# Patient Record
Sex: FEMALE | Race: WHITE | Employment: PART TIME | URBAN - METROPOLITAN AREA
[De-identification: names, ages, dates, MRNs, and addresses within clinical notes are randomized per-mention and may not be internally consistent; named-entity substitution may affect disease eponyms.]

---

## 2021-03-14 ENCOUNTER — APPOINTMENT (OUTPATIENT)
Dept: ULTRASOUND IMAGING | Age: 21
End: 2021-03-14
Attending: EMERGENCY MEDICINE

## 2021-03-14 ENCOUNTER — HOSPITAL ENCOUNTER (EMERGENCY)
Age: 21
Discharge: HOME | End: 2021-03-14
Attending: EMERGENCY MEDICINE

## 2021-03-14 VITALS
DIASTOLIC BLOOD PRESSURE: 80 MMHG | RESPIRATION RATE: 16 BRPM | HEART RATE: 90 BPM | TEMPERATURE: 99.1 F | SYSTOLIC BLOOD PRESSURE: 124 MMHG | BODY MASS INDEX: 27.31 KG/M2 | HEIGHT: 64 IN | WEIGHT: 160 LBS | OXYGEN SATURATION: 99 %

## 2021-03-14 DIAGNOSIS — N83.202 CYSTS OF BOTH OVARIES: ICD-10-CM

## 2021-03-14 DIAGNOSIS — N83.201 CYSTS OF BOTH OVARIES: ICD-10-CM

## 2021-03-14 DIAGNOSIS — N93.8 DUB (DYSFUNCTIONAL UTERINE BLEEDING): Primary | ICD-10-CM

## 2021-03-14 PROCEDURE — 99283 EMERGENCY DEPT VISIT LOW MDM: CPT

## 2021-03-14 PROCEDURE — 76830 TRANSVAGINAL US NON-OB: CPT

## 2021-03-14 RX ORDER — MEDROXYPROGESTERONE ACETATE 5 MG/1
5 TABLET ORAL DAILY
Qty: 10 TAB | Refills: 0 | Status: SHIPPED | OUTPATIENT
Start: 2021-03-14

## 2021-03-14 RX ORDER — IBUPROFEN 800 MG/1
800 TABLET ORAL
Qty: 20 TAB | Refills: 0 | Status: SHIPPED | OUTPATIENT
Start: 2021-03-14 | End: 2021-03-21

## 2021-03-14 NOTE — ED TRIAGE NOTES
Patient ambulatory to triage with mask in place. Patient reports vaginal bleeding x 13 days. Pt reports bleeding through 6-7 pads/day. Pt reports abd cramping and fatigue.

## 2021-03-14 NOTE — ED NOTES
I have reviewed discharge instructions with the patient. The patient verbalized understanding. Patient left ED via Discharge Method: ambulatory to Home with friend. Opportunity for questions and clarification provided. Patient given 2 scripts. To continue your aftercare when you leave the hospital, you may receive an automated call from our care team to check in on how you are doing. This is a free service and part of our promise to provide the best care and service to meet your aftercare needs.  If you have questions, or wish to unsubscribe from this service please call [phone]. Thank you for Choosing our [facility] Emergency Department.

## 2021-03-14 NOTE — LETTER
[address] [facility] EMERGENCY DEPT 
[address] [facility] 
[phone] Work/School Note Date: 3/14/2021 To Whom It May concern: 
 
Sharyn Cintron was seen and treated today in the emergency room by the following provider(s): 
Attending Provider: Hermes Ford MD. Sharyn Cintron may return to work on 3/16/21.  
 
Sincerely,

## 2021-03-14 NOTE — ED PROVIDER NOTES
28-year-old female presenting for concern regarding persistent vaginal bleeding. Tells me that she has been bleeding for 13 days which is highly unusual for her  Associated with some cramping and abdominal pain  Decreased appetite and weakness  Tells me she is going through 6 or 7 pads per day  Seen yesterday for the same thing was prescribed a medication that she has not started  Denies fevers chills or vomiting    The history is provided by the patient. Vaginal Bleeding  This is a recurrent problem. The current episode started more than 1 week ago. The problem occurs daily. The problem has not changed since onset. Associated symptoms include abdominal pain. Pertinent negatives include no chest pain, no headaches and no shortness of breath. Nothing aggravates the symptoms. Nothing relieves the symptoms. She has tried nothing for the symptoms. The treatment provided no relief. No past medical history on file. No past surgical history on file. No family history on file.     Social History     Socioeconomic History    Marital status: SINGLE     Spouse name: Not on file    Number of children: Not on file    Years of education: Not on file    Highest education level: Not on file   Occupational History    Not on file   Social Needs    Financial resource strain: Not on file    Food insecurity     Worry: Not on file     Inability: Not on file    Transportation needs     Medical: Not on file     Non-medical: Not on file   Tobacco Use    Smoking status: Not on file   Substance and Sexual Activity    Alcohol use: Not on file    Drug use: Not on file    Sexual activity: Not on file   Lifestyle    Physical activity     Days per week: Not on file     Minutes per session: Not on file    Stress: Not on file   Relationships    Social connections     Talks on phone: Not on file     Gets together: Not on file     Attends Church service: Not on file     Active member of club or organization: Not on file     Attends meetings of clubs or organizations: Not on file     Relationship status: Not on file    Intimate partner violence     Fear of current or ex partner: Not on file     Emotionally abused: Not on file     Physically abused: Not on file     Forced sexual activity: Not on file   Other Topics Concern    Not on file   Social History Narrative    Not on file         ALLERGIES: Patient has no known allergies. Review of Systems   Respiratory: Negative for shortness of breath. Cardiovascular: Negative for chest pain. Gastrointestinal: Positive for abdominal pain. Genitourinary: Positive for vaginal bleeding. Neurological: Negative for headaches. All other systems reviewed and are negative. Vitals:    03/14/21 1137   BP: 124/80   Pulse: 90   Resp: 16   Temp: 99.1 °F (37.3 °C)   SpO2: 99%   Weight: 72.6 kg (160 lb)   Height: 5' 4\" (1.626 m)            Physical Exam  Vitals signs and nursing note reviewed. Constitutional:       Appearance: She is well-developed. HENT:      Head: Normocephalic and atraumatic. Eyes:      Extraocular Movements: Extraocular movements intact. Conjunctiva/sclera: Conjunctivae normal.      Pupils: Pupils are equal, round, and reactive to light. Neck:      Musculoskeletal: Neck supple. Pulmonary:      Effort: Pulmonary effort is normal.   Musculoskeletal: Normal range of motion. Skin:     General: Skin is warm and dry. Neurological:      Mental Status: She is alert and oriented to person, place, and time. MDM  Number of Diagnoses or Management Options  Cysts of both ovaries  DUB (dysfunctional uterine bleeding)  Diagnosis management comments: Pleasant 39-year-old female presenting for dysfunctional uterine bleeding  Patient was seen yesterday and had blood work done showed a normal hemoglobin otherwise unremarkable findings. We will add an ultrasound to the patient's work-up given her return visit and increased concern.          Amount and/or Complexity of Data Reviewed  Tests in the radiology section of CPT®: ordered and reviewed (Us Transvaginal    Result Date: 3/14/2021  Exam: Transabdominal and transvaginal pelvic ultrasound. INDICATION: Menorrhagia. Comparison: None. FINDINGS: Uterus/Cervix: The uterus is normal in echogenicity and size measuring 6.7 x 3.3 x 4.9 cm. The endometrial complex is normal measuring 3 mm. No uterine fibroids. Nabothian cyst evident. Right Ovary/Adnexa: The right ovary is normal in echogenicity and size measuring 3.9 x 2.8 x 2.8 cm. Complex cyst likely corpus luteal cyst measuring 2.3 x 2.0 x 1.6 cm. Left Ovary/Adnexa: The left ovary is normal in echogenicity and size measuring 3.7 x 1.9 x 2.5 cm. Complex left ovarian cyst, likely hemorrhagic measuring 2.1 x 0.8 x 1.6 cm. Free Fluid: Moderate volume of free fluid in the pelvis with some debris. 1. Endometrial complex is normal in thickness measuring 3 mm. No uterine fibroid. 2. Moderate volume of free fluid in the pelvis with debris. 3. Complex right ovarian cyst measuring 2.3 cm, likely corpus luteal cyst. Complex left ovarian cyst measuring 2.1 cm, likely hemorrhagic cyst.     )  Decide to obtain previous medical records or to obtain history from someone other than the patient: yes  Review and summarize past medical records: yes (Unremarkable blood work at [facility] yesterday for the same complaint)  Independent visualization of images, tracings, or specimens: yes    Risk of Complications, Morbidity, and/or Mortality  Presenting problems: high  Diagnostic procedures: moderate  Management options: moderate  General comments: I personally reviewed the patient's vital signs, laboratory tests, and/or radiological findings. I discussed these findings with the patient and their significance. I answered all questions and gave the patient clear return precautions.   The patient was discharged from the emergency department in stable condition        Patient Progress  Patient progress: improved         Procedures

## 2021-03-14 NOTE — DISCHARGE INSTRUCTIONS
Your blood work yesterday was unremarkable and then your ultrasound today shows that the uterus is normal.  You do have bilateral functional ovarian cyst which is normal    I have prescribed Provera instead of the birth control you are put on yesterday. This is the progesterone only medication and will help stabilize the endometrium.   800 mg of Motrin every 6 hours is also appropriate for cramping

## 2023-07-29 ENCOUNTER — HOSPITAL ENCOUNTER (EMERGENCY)
Age: 23
Discharge: HOME | End: 2023-07-29
Attending: EMERGENCY MEDICINE

## 2023-07-29 VITALS
RESPIRATION RATE: 16 BRPM | WEIGHT: 164 LBS | BODY MASS INDEX: 27.32 KG/M2 | OXYGEN SATURATION: 100 % | TEMPERATURE: 98.4 F | HEIGHT: 65 IN | DIASTOLIC BLOOD PRESSURE: 68 MMHG | SYSTOLIC BLOOD PRESSURE: 109 MMHG | HEART RATE: 89 BPM
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
## 2023-07-29 DIAGNOSIS — S06.0X0A CONCUSSION WITHOUT LOSS OF CONSCIOUSNESS, INITIAL ENCOUNTER: ICD-10-CM

## 2023-07-29 PROCEDURE — 6370000000 HC RX 637 (ALT 250 FOR IP): Performed by: STUDENT IN AN ORGANIZED HEALTH CARE EDUCATION/TRAINING PROGRAM

## 2023-07-29 PROCEDURE — 99283 EMERGENCY DEPT VISIT LOW MDM: CPT

## 2023-07-29 RX ORDER — ACETAMINOPHEN 500 MG
1000 TABLET ORAL
Status: COMPLETED | OUTPATIENT
Start: 2023-07-29 | End: 2023-07-29

## 2023-07-29 RX ADMIN — ACETAMINOPHEN 1000 MG: 500 TABLET, FILM COATED ORAL at 13:57

## 2023-07-29 ASSESSMENT — ENCOUNTER SYMPTOMS
FACIAL SWELLING: 0
TROUBLE SWALLOWING: 0
PHOTOPHOBIA: 1
ABDOMINAL PAIN: 0
COUGH: 0
SHORTNESS OF BREATH: 0
VOMITING: 0

## 2023-07-29 ASSESSMENT — PAIN SCALES - GENERAL: PAINLEVEL_OUTOF10: 5

## 2023-07-29 ASSESSMENT — PAIN DESCRIPTION - LOCATION: LOCATION: HEAD

## 2023-07-29 ASSESSMENT — PAIN - FUNCTIONAL ASSESSMENT: PAIN_FUNCTIONAL_ASSESSMENT: 0-10

## 2023-07-29 ASSESSMENT — VISUAL ACUITY: OU: 1
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
## 2024-09-09 ENCOUNTER — HOSPITAL ENCOUNTER (EMERGENCY)
Age: 24
Discharge: HOME | End: 2024-09-09
Attending: STUDENT IN AN ORGANIZED HEALTH CARE EDUCATION/TRAINING PROGRAM

## 2024-09-09 VITALS
TEMPERATURE: 98.4 F | BODY MASS INDEX: 30.71 KG/M2 | RESPIRATION RATE: 16 BRPM | HEIGHT: 65 IN | OXYGEN SATURATION: 99 % | DIASTOLIC BLOOD PRESSURE: 66 MMHG | WEIGHT: 184.3 LBS | SYSTOLIC BLOOD PRESSURE: 110 MMHG | HEART RATE: 80 BPM

## 2024-09-09 DIAGNOSIS — N39.0 URINARY TRACT INFECTION IN FEMALE: ICD-10-CM

## 2024-09-09 DIAGNOSIS — D50.9 MICROCYTIC ANEMIA: ICD-10-CM

## 2024-09-09 DIAGNOSIS — K50.90 CROHN'S DISEASE WITHOUT COMPLICATION, UNSPECIFIED GASTROINTESTINAL TRACT LOCATION (HCC): Primary | ICD-10-CM

## 2024-09-09 LAB
ALBUMIN SERPL-MCNC: 4.3 G/DL (ref 3.5–5)
ALBUMIN/GLOB SERPL: 1.2 (ref 1–1.9)
ALP SERPL-CCNC: 64 U/L (ref 35–104)
ALT SERPL-CCNC: 16 U/L (ref 12–65)
ANION GAP SERPL CALC-SCNC: 12 MMOL/L (ref 9–18)
APPEARANCE UR: CLEAR
AST SERPL-CCNC: 22 U/L (ref 15–37)
BACTERIA URNS QL MICRO: ABNORMAL /HPF
BASOPHILS # BLD: 0.1 K/UL (ref 0–0.2)
BASOPHILS NFR BLD: 1 % (ref 0–2)
BILIRUB SERPL-MCNC: 0.3 MG/DL (ref 0–1.2)
BILIRUB UR QL: NEGATIVE
BUN SERPL-MCNC: 8 MG/DL (ref 6–23)
CALCIUM SERPL-MCNC: 9.5 MG/DL (ref 8.8–10.2)
CASTS URNS QL MICRO: 0 /LPF
CHLORIDE SERPL-SCNC: 102 MMOL/L (ref 98–107)
CO2 SERPL-SCNC: 22 MMOL/L (ref 20–28)
COLOR UR: ABNORMAL
CREAT SERPL-MCNC: 0.56 MG/DL (ref 0.6–1.1)
CRYSTALS URNS QL MICRO: 0 /LPF
DIFFERENTIAL METHOD BLD: ABNORMAL
EOSINOPHIL # BLD: 0.2 K/UL (ref 0–0.8)
EOSINOPHIL NFR BLD: 2 % (ref 0.5–7.8)
EPI CELLS #/AREA URNS HPF: ABNORMAL /HPF
ERYTHROCYTE [DISTWIDTH] IN BLOOD BY AUTOMATED COUNT: 16.5 % (ref 11.9–14.6)
GLOBULIN SER CALC-MCNC: 3.6 G/DL (ref 2.3–3.5)
GLUCOSE SERPL-MCNC: 87 MG/DL (ref 70–99)
GLUCOSE UR STRIP.AUTO-MCNC: NEGATIVE MG/DL
HCG UR QL: NEGATIVE
HCT VFR BLD AUTO: 24.5 % (ref 35.8–46.3)
HGB BLD-MCNC: 6.7 G/DL (ref 11.7–15.4)
HGB UR QL STRIP: ABNORMAL
HISTORY CHECK: NORMAL
IMM GRANULOCYTES # BLD AUTO: 0 K/UL (ref 0–0.5)
IMM GRANULOCYTES NFR BLD AUTO: 0 % (ref 0–5)
KETONES UR QL STRIP.AUTO: NEGATIVE MG/DL
LEUKOCYTE ESTERASE UR QL STRIP.AUTO: ABNORMAL
LIPASE SERPL-CCNC: 25 U/L (ref 13–60)
LYMPHOCYTES # BLD: 1.9 K/UL (ref 0.5–4.6)
LYMPHOCYTES NFR BLD: 21 % (ref 13–44)
MAGNESIUM SERPL-MCNC: 1.8 MG/DL (ref 1.8–2.4)
MCH RBC QN AUTO: 18.5 PG (ref 26.1–32.9)
MCHC RBC AUTO-ENTMCNC: 27.3 G/DL (ref 31.4–35)
MCV RBC AUTO: 67.7 FL (ref 82–102)
MONOCYTES # BLD: 0.7 K/UL (ref 0.1–1.3)
MONOCYTES NFR BLD: 7 % (ref 4–12)
MUCOUS THREADS URNS QL MICRO: ABNORMAL /LPF
NEUTS SEG # BLD: 6 K/UL (ref 1.7–8.2)
NEUTS SEG NFR BLD: 68 % (ref 43–78)
NITRITE UR QL STRIP.AUTO: NEGATIVE
NRBC # BLD: 0 K/UL (ref 0–0.2)
OTHER OBSERVATIONS: ABNORMAL
PH UR STRIP: 6.5 (ref 5–9)
PLATELET # BLD AUTO: 570 K/UL (ref 150–450)
PMV BLD AUTO: 9 FL (ref 9.4–12.3)
POTASSIUM SERPL-SCNC: 4 MMOL/L (ref 3.5–5.1)
PROT SERPL-MCNC: 7.9 G/DL (ref 6.3–8.2)
PROT UR STRIP-MCNC: NEGATIVE MG/DL
RBC # BLD AUTO: 3.62 M/UL (ref 4.05–5.2)
RBC #/AREA URNS HPF: ABNORMAL /HPF
SODIUM SERPL-SCNC: 137 MMOL/L (ref 136–145)
SP GR UR REFRACTOMETRY: 1.02 (ref 1–1.02)
URINE CULTURE IF INDICATED: ABNORMAL
UROBILINOGEN UR QL STRIP.AUTO: 0.2 EU/DL (ref 0.2–1)
WBC # BLD AUTO: 8.8 K/UL (ref 4.3–11.1)
WBC URNS QL MICRO: ABNORMAL /HPF

## 2024-09-09 PROCEDURE — 96361 HYDRATE IV INFUSION ADD-ON: CPT

## 2024-09-09 PROCEDURE — 81003 URINALYSIS AUTO W/O SCOPE: CPT

## 2024-09-09 PROCEDURE — 81025 URINE PREGNANCY TEST: CPT

## 2024-09-09 PROCEDURE — 6360000002 HC RX W HCPCS: Performed by: STUDENT IN AN ORGANIZED HEALTH CARE EDUCATION/TRAINING PROGRAM

## 2024-09-09 PROCEDURE — 80053 COMPREHEN METABOLIC PANEL: CPT

## 2024-09-09 PROCEDURE — 83735 ASSAY OF MAGNESIUM: CPT

## 2024-09-09 PROCEDURE — 96375 TX/PRO/DX INJ NEW DRUG ADDON: CPT

## 2024-09-09 PROCEDURE — 83690 ASSAY OF LIPASE: CPT

## 2024-09-09 PROCEDURE — 81001 URINALYSIS AUTO W/SCOPE: CPT

## 2024-09-09 PROCEDURE — 86850 RBC ANTIBODY SCREEN: CPT

## 2024-09-09 PROCEDURE — 85025 COMPLETE CBC W/AUTO DIFF WBC: CPT

## 2024-09-09 PROCEDURE — 86900 BLOOD TYPING SEROLOGIC ABO: CPT

## 2024-09-09 PROCEDURE — 86923 COMPATIBILITY TEST ELECTRIC: CPT

## 2024-09-09 PROCEDURE — 99285 EMERGENCY DEPT VISIT HI MDM: CPT

## 2024-09-09 PROCEDURE — 86901 BLOOD TYPING SEROLOGIC RH(D): CPT

## 2024-09-09 PROCEDURE — 36430 TRANSFUSION BLD/BLD COMPNT: CPT

## 2024-09-09 PROCEDURE — P9016 RBC LEUKOCYTES REDUCED: HCPCS

## 2024-09-09 PROCEDURE — 2580000003 HC RX 258: Performed by: STUDENT IN AN ORGANIZED HEALTH CARE EDUCATION/TRAINING PROGRAM

## 2024-09-09 PROCEDURE — 96374 THER/PROPH/DIAG INJ IV PUSH: CPT

## 2024-09-09 PROCEDURE — 6370000000 HC RX 637 (ALT 250 FOR IP): Performed by: STUDENT IN AN ORGANIZED HEALTH CARE EDUCATION/TRAINING PROGRAM

## 2024-09-09 RX ORDER — PANTOPRAZOLE SODIUM 40 MG/1
40 TABLET, DELAYED RELEASE ORAL
Qty: 90 TABLET | Refills: 1 | Status: SHIPPED | OUTPATIENT
Start: 2024-09-09

## 2024-09-09 RX ORDER — SODIUM CHLORIDE 9 MG/ML
INJECTION, SOLUTION INTRAVENOUS PRN
Status: DISCONTINUED | OUTPATIENT
Start: 2024-09-09 | End: 2024-09-09 | Stop reason: HOSPADM

## 2024-09-09 RX ORDER — DEXAMETHASONE SODIUM PHOSPHATE 10 MG/ML
10 INJECTION INTRAMUSCULAR; INTRAVENOUS ONCE
Status: COMPLETED | OUTPATIENT
Start: 2024-09-09 | End: 2024-09-09

## 2024-09-09 RX ORDER — FERROUS SULFATE 325(65) MG
325 TABLET ORAL
Qty: 90 TABLET | Refills: 1 | Status: SHIPPED | OUTPATIENT
Start: 2024-09-09

## 2024-09-09 RX ORDER — CEPHALEXIN 500 MG/1
500 CAPSULE ORAL 3 TIMES DAILY
Qty: 15 CAPSULE | Refills: 0 | Status: SHIPPED | OUTPATIENT
Start: 2024-09-09 | End: 2024-09-14

## 2024-09-09 RX ORDER — DICYCLOMINE HYDROCHLORIDE 10 MG/1
10 CAPSULE ORAL
Status: DISCONTINUED | OUTPATIENT
Start: 2024-09-09 | End: 2024-09-09 | Stop reason: HOSPADM

## 2024-09-09 RX ORDER — ONDANSETRON 2 MG/ML
4 INJECTION INTRAMUSCULAR; INTRAVENOUS
Status: COMPLETED | OUTPATIENT
Start: 2024-09-09 | End: 2024-09-09

## 2024-09-09 RX ORDER — ACETAMINOPHEN 325 MG/1
650 TABLET ORAL
Status: COMPLETED | OUTPATIENT
Start: 2024-09-09 | End: 2024-09-09

## 2024-09-09 RX ORDER — SODIUM CHLORIDE, SODIUM LACTATE, POTASSIUM CHLORIDE, AND CALCIUM CHLORIDE .6; .31; .03; .02 G/100ML; G/100ML; G/100ML; G/100ML
1000 INJECTION, SOLUTION INTRAVENOUS ONCE
Status: COMPLETED | OUTPATIENT
Start: 2024-09-09 | End: 2024-09-09

## 2024-09-09 RX ORDER — PREDNISONE 50 MG/1
50 TABLET ORAL DAILY
Qty: 5 TABLET | Refills: 0 | Status: SHIPPED | OUTPATIENT
Start: 2024-09-09 | End: 2024-09-14

## 2024-09-09 RX ORDER — FERROUS SULFATE 325(65) MG
325 TABLET ORAL 2 TIMES DAILY WITH MEALS
Status: COMPLETED | OUTPATIENT
Start: 2024-09-09 | End: 2024-09-09

## 2024-09-09 RX ADMIN — SODIUM CHLORIDE, POTASSIUM CHLORIDE, SODIUM LACTATE AND CALCIUM CHLORIDE 1000 ML: 600; 310; 30; 20 INJECTION, SOLUTION INTRAVENOUS at 12:18

## 2024-09-09 RX ADMIN — ACETAMINOPHEN 650 MG: 325 TABLET ORAL at 12:06

## 2024-09-09 RX ADMIN — FERROUS SULFATE TAB 325 MG (65 MG ELEMENTAL FE) 325 MG: 325 (65 FE) TAB at 15:17

## 2024-09-09 RX ADMIN — WATER 1000 MG: 1 INJECTION INTRAMUSCULAR; INTRAVENOUS; SUBCUTANEOUS at 15:11

## 2024-09-09 RX ADMIN — ONDANSETRON 4 MG: 2 INJECTION INTRAMUSCULAR; INTRAVENOUS at 12:14

## 2024-09-09 RX ADMIN — DEXAMETHASONE SODIUM PHOSPHATE 10 MG: 10 INJECTION INTRAMUSCULAR; INTRAVENOUS at 12:16

## 2024-09-09 RX ADMIN — PANTOPRAZOLE SODIUM 40 MG: 40 INJECTION, POWDER, FOR SOLUTION INTRAVENOUS at 12:22

## 2024-09-09 ASSESSMENT — PAIN - FUNCTIONAL ASSESSMENT
PAIN_FUNCTIONAL_ASSESSMENT: 0-10
PAIN_FUNCTIONAL_ASSESSMENT: NONE - DENIES PAIN

## 2024-09-09 ASSESSMENT — LIFESTYLE VARIABLES
HOW MANY STANDARD DRINKS CONTAINING ALCOHOL DO YOU HAVE ON A TYPICAL DAY: 3 OR 4
HOW OFTEN DO YOU HAVE A DRINK CONTAINING ALCOHOL: MONTHLY OR LESS

## 2024-09-09 ASSESSMENT — PAIN DESCRIPTION - LOCATION
LOCATION: ABDOMEN
LOCATION: ABDOMEN

## 2024-09-09 ASSESSMENT — PAIN SCALES - GENERAL: PAINLEVEL_OUTOF10: 4

## 2024-09-10 LAB
ABO + RH BLD: NORMAL
BILIRUB UR QL: NEGATIVE
BLD PROD TYP BPU: NORMAL
BLOOD BANK BLOOD PRODUCT EXPIRATION DATE: NORMAL
BLOOD BANK CMNT PATIENT-IMP: NORMAL
BLOOD BANK DISPENSE STATUS: NORMAL
BLOOD BANK ISBT PRODUCT BLOOD TYPE: 6200
BLOOD BANK PRODUCT CODE: NORMAL
BLOOD BANK UNIT TYPE AND RH: NORMAL
BLOOD GROUP ANTIBODIES SERPL: NORMAL
BPU ID: NORMAL
CROSSMATCH RESULT: NORMAL
GLUCOSE UR QL STRIP.AUTO: NEGATIVE MG/DL
KETONES UR-MCNC: NEGATIVE MG/DL
LEUKOCYTE ESTERASE UR QL STRIP: ABNORMAL
NITRITE UR QL: NEGATIVE
PH UR: 7 (ref 5–9)
PROT UR QL: NEGATIVE MG/DL
RBC # UR STRIP: ABNORMAL
SP GR UR: 1.02 (ref 1–1.02)
SPECIMEN EXP DATE BLD: NORMAL
UNIT DIVISION: 0
UNIT ISSUE DATE/TIME: NORMAL
UROBILINOGEN UR QL: 0.2 EU/DL (ref 0.2–1)

## 2024-09-11 ENCOUNTER — TELEPHONE (OUTPATIENT)
Dept: GASTROENTEROLOGY | Age: 24
End: 2024-09-11

## 2024-09-19 ENCOUNTER — TELEPHONE (OUTPATIENT)
Dept: GASTROENTEROLOGY | Age: 24
End: 2024-09-19

## 2024-09-23 ENCOUNTER — TELEPHONE (OUTPATIENT)
Age: 24
End: 2024-09-23

## 2024-10-08 ENCOUNTER — PREP FOR PROCEDURE (OUTPATIENT)
Dept: GASTROENTEROLOGY | Age: 24
End: 2024-10-08

## 2024-10-08 ENCOUNTER — OFFICE VISIT (OUTPATIENT)
Dept: GASTROENTEROLOGY | Age: 24
End: 2024-10-08

## 2024-10-08 VITALS
OXYGEN SATURATION: 99 % | SYSTOLIC BLOOD PRESSURE: 119 MMHG | HEIGHT: 65 IN | RESPIRATION RATE: 16 BRPM | DIASTOLIC BLOOD PRESSURE: 72 MMHG | HEART RATE: 86 BPM | TEMPERATURE: 98 F | BODY MASS INDEX: 28.99 KG/M2 | WEIGHT: 174 LBS

## 2024-10-08 DIAGNOSIS — K50.919 CROHN'S DISEASE WITH COMPLICATION, UNSPECIFIED GASTROINTESTINAL TRACT LOCATION (HCC): ICD-10-CM

## 2024-10-08 DIAGNOSIS — K50.919 CROHN'S DISEASE WITH COMPLICATION, UNSPECIFIED GASTROINTESTINAL TRACT LOCATION (HCC): Primary | ICD-10-CM

## 2024-10-08 PROBLEM — K50.90 CROHN'S DISEASE (HCC): Status: ACTIVE | Noted: 2024-10-08

## 2024-10-08 LAB
ALBUMIN SERPL-MCNC: 3.9 G/DL (ref 3.5–5)
ALBUMIN/GLOB SERPL: 1.2 (ref 1–1.9)
ALP SERPL-CCNC: 62 U/L (ref 35–104)
ALT SERPL-CCNC: 16 U/L (ref 8–45)
ANION GAP SERPL CALC-SCNC: 11 MMOL/L (ref 9–18)
AST SERPL-CCNC: 20 U/L (ref 15–37)
BILIRUB SERPL-MCNC: 0.2 MG/DL (ref 0–1.2)
BUN SERPL-MCNC: 8 MG/DL (ref 6–23)
CALCIUM SERPL-MCNC: 9.2 MG/DL (ref 8.8–10.2)
CHLORIDE SERPL-SCNC: 105 MMOL/L (ref 98–107)
CO2 SERPL-SCNC: 25 MMOL/L (ref 20–28)
CREAT SERPL-MCNC: 0.66 MG/DL (ref 0.6–1.1)
ERYTHROCYTE [SEDIMENTATION RATE] IN BLOOD: 9 MM/HR (ref 0–20)
GLOBULIN SER CALC-MCNC: 3.4 G/DL (ref 2.3–3.5)
GLUCOSE SERPL-MCNC: 95 MG/DL (ref 70–99)
HBV SURFACE AG SER QL: NONREACTIVE
POTASSIUM SERPL-SCNC: 4.3 MMOL/L (ref 3.5–5.1)
PROT SERPL-MCNC: 7.3 G/DL (ref 6.3–8.2)
SODIUM SERPL-SCNC: 140 MMOL/L (ref 136–145)

## 2024-10-08 PROCEDURE — 99204 OFFICE O/P NEW MOD 45 MIN: CPT | Performed by: STUDENT IN AN ORGANIZED HEALTH CARE EDUCATION/TRAINING PROGRAM

## 2024-10-08 RX ORDER — SODIUM CHLORIDE 9 MG/ML
25 INJECTION, SOLUTION INTRAVENOUS PRN
Status: CANCELLED | OUTPATIENT
Start: 2024-10-08

## 2024-10-08 RX ORDER — SODIUM CHLORIDE 0.9 % (FLUSH) 0.9 %
5-40 SYRINGE (ML) INJECTION EVERY 12 HOURS SCHEDULED
Status: CANCELLED | OUTPATIENT
Start: 2024-10-08

## 2024-10-08 RX ORDER — SODIUM CHLORIDE 0.9 % (FLUSH) 0.9 %
5-40 SYRINGE (ML) INJECTION PRN
Status: CANCELLED | OUTPATIENT
Start: 2024-10-08

## 2024-10-08 NOTE — PROGRESS NOTES
Silvia Cervantes is 23 y.o. y/o female here for initial evaluation.     No procedures noted in the chart review.   CT ABDOMEN PELVIS WO CONTRAST  7/2022  FINDINGS:     LOWER CHEST: Unremarkable.     LIVER AND BILIARY: Unremarkable appearance of the unenhanced liver.  No biliary dilation. Gallbladder is unremarkable.     PANCREAS: Unremarkable.     SPLEEN: Unremarkable.     ADRENALS: Unremarkable.     KIDNEYS, URETERS, AND BLADDER: No radiopaque nephrolithiasis or hydronephrosis. Urinary bladder is decompressed.     GI TRACT AND PERITONEUM: No bowel obstruction. Normal appendix. No significant small bowel wall thickening appreciated. The terminal ileum is decompressed, with submucosal fatty deposition, likely indicative of prior inflammation. Wall thickening and submucosal edema of the colon and rectum with mild adjacent inflammatory stranding and small volume pelvic free fluid. No organized fluid collection. No free intraperitoneal gas.     VASCULATURE: No abdominal aortic aneurysm.     LYMPH NODES: No lymphadenopathy.     REPRODUCTIVE ORGANS: Unremarkable.     MUSCULOSKELETAL: Well-circumscribed cystic structure along the left aspect of the labia measuring 3.0 x 2.2 cm, likely a Bartholin gland cyst. No acute bony abnormality.     She reports diagnosis of Crohn's disease at age 6, between age [age] she was managed with Asacol.  Then she reports that she was in remission between age [age] however this was based on only symptoms per her report.  Then in the last 2 years she has been having slowly worsening symptoms along with flares intermittently.  Currently she is having significant abdominal cramping, ongoing bloody diarrhea, noted recent ER visit with low hemoglobin that required transfusion.    Lab Results   Component Value Date    HGB 6.7 (LL) 09/09/2024    WBC 8.8 09/09/2024     (H) 09/09/2024    MCV 67.7 (L) 09/09/2024    CREATININE 0.56 (L) 09/09/2024    ALT 16 09/09/2024    AST 22 09/09/2024       No

## 2024-10-09 LAB
BASOPHILS # BLD: 0.1 K/UL (ref 0–0.2)
BASOPHILS NFR BLD: 1 % (ref 0–2)
DIFFERENTIAL METHOD BLD: ABNORMAL
EOSINOPHIL # BLD: 0.2 K/UL (ref 0–0.8)
EOSINOPHIL NFR BLD: 3 % (ref 0.5–7.8)
HCT VFR BLD AUTO: 35.8 % (ref 35.8–46.3)
HGB BLD-MCNC: 10.5 G/DL (ref 11.7–15.4)
IMM GRANULOCYTES # BLD AUTO: 0 K/UL (ref 0–0.5)
IMM GRANULOCYTES NFR BLD AUTO: 0 % (ref 0–5)
LYMPHOCYTES # BLD: 2.4 K/UL (ref 0.5–4.6)
LYMPHOCYTES NFR BLD: 29 % (ref 13–44)
MCH RBC QN AUTO: 23.1 PG (ref 26.1–32.9)
MCHC RBC AUTO-ENTMCNC: 29.3 G/DL (ref 31.4–35)
MCV RBC AUTO: 78.7 FL (ref 82–102)
MONOCYTES # BLD: 0.7 K/UL (ref 0.1–1.3)
MONOCYTES NFR BLD: 9 % (ref 4–12)
NEUTS SEG # BLD: 4.8 K/UL (ref 1.7–8.2)
NEUTS SEG NFR BLD: 58 % (ref 43–78)
NRBC # BLD: 0 K/UL (ref 0–0.2)
PLATELET # BLD AUTO: 488 K/UL (ref 150–450)
PLATELET COMMENT: SLIGHT
PMV BLD AUTO: 9 FL (ref 9.4–12.3)
RBC # BLD AUTO: 4.55 M/UL (ref 4.05–5.2)
RBC MORPH BLD: ABNORMAL
WBC # BLD AUTO: 8.2 K/UL (ref 4.3–11.1)
WBC MORPH BLD: ABNORMAL

## 2024-10-09 NOTE — PERIOP NOTE
Patient verified name, , and procedure.    Type: 1a; abbreviated assessment per anesthesia guidelines    Labs per anesthesia: None    Instructed pt that they will be notified the day before their procedure by the GI Lab for time of arrival if their procedure is [place] and Pre-op for [place] cases. Arrival times should be called by 5 pm. If no phone is received the patient should contact their respective hospital. The GI lab telephone number is [phone] and ES Pre-op is [phone].     Follow diet and prep instructions per office including NPO status with exception of drink 32 ounces of non-caffeinated clear liquids 2 hours prior to your arrival to avoid dehydration.       Bath or shower the night before and the am of surgery with non-moisturizing soap. No lotions, oils, powders, cologne on skin. No make up, eye make up or jewelry. Wear loose fitting comfortable, clean clothing.     Must have adult present in building the entire time .     Medications for the day of procedure Pantoprozole, patient to hold Vitamins, supplements, herbals and NSAIDS  per anesthesia guidelines.     The following discharge instructions reviewed with patient: medication given during procedure may cause drowsiness for several hours, therefore, do not drive or operate machinery for remainder of the day. You may not drink alcohol on the day of your procedure, please resume regular diet and activity unless otherwise directed. You may experience abdominal distention for several hours that is relieved by the passage of gas. Contact your physician if you have any of the following: fever or chills, severe abdominal pain or excessive amount of bleeding or a large amount when having a bowel movement. Occasional specks of blood with bowel movement would not be unusual.    Pre surgery instructions sent to [facility]

## 2024-10-10 LAB — CRP SERPL-MCNC: <1 MG/L (ref 0–10)

## 2024-10-11 LAB
M TB IFN-G BLD-IMP: NEGATIVE
M TB IFN-G CD4+ T-CELLS BLD-ACNC: 0.04 IU/ML
M TBIFN-G CD4+ CD8+T-CELLS BLD-ACNC: 0.03 IU/ML
QUANTIFERON CRITERIA: NORMAL
QUANTIFERON MITOGEN VALUE: >10 IU/ML
QUANTIFERON NIL VALUE: 0.01 IU/ML
QUANTIFERON, INCUBATION: NORMAL

## 2024-10-11 NOTE — PROGRESS NOTES
Attempted to call patient regarding procedure on 10/14/24. No answer. Left voicemail with arrival time of 1130, address,  requirements and prep instructions.

## 2024-10-14 ENCOUNTER — TELEPHONE (OUTPATIENT)
Dept: GASTROENTEROLOGY | Age: 24
End: 2024-10-14

## 2024-10-14 ENCOUNTER — ANESTHESIA EVENT (OUTPATIENT)
Dept: ENDOSCOPY | Age: 24
End: 2024-10-14

## 2024-10-14 ENCOUNTER — HOSPITAL ENCOUNTER (OUTPATIENT)
Age: 24
Discharge: HOME | End: 2024-10-14
Attending: STUDENT IN AN ORGANIZED HEALTH CARE EDUCATION/TRAINING PROGRAM | Admitting: STUDENT IN AN ORGANIZED HEALTH CARE EDUCATION/TRAINING PROGRAM

## 2024-10-14 ENCOUNTER — ANESTHESIA (OUTPATIENT)
Dept: ENDOSCOPY | Age: 24
End: 2024-10-14

## 2024-10-14 VITALS
RESPIRATION RATE: 13 BRPM | HEIGHT: 65 IN | WEIGHT: 174 LBS | OXYGEN SATURATION: 96 % | BODY MASS INDEX: 28.99 KG/M2 | DIASTOLIC BLOOD PRESSURE: 64 MMHG | HEART RATE: 69 BPM | TEMPERATURE: 98.1 F | SYSTOLIC BLOOD PRESSURE: 105 MMHG

## 2024-10-14 PROCEDURE — 3700000001 HC ADD 15 MINUTES (ANESTHESIA): Performed by: STUDENT IN AN ORGANIZED HEALTH CARE EDUCATION/TRAINING PROGRAM

## 2024-10-14 PROCEDURE — 2500000003 HC RX 250 WO HCPCS: Performed by: REGISTERED NURSE

## 2024-10-14 PROCEDURE — 3609010300 HC COLONOSCOPY W/BIOPSY SINGLE/MULTIPLE: Performed by: STUDENT IN AN ORGANIZED HEALTH CARE EDUCATION/TRAINING PROGRAM

## 2024-10-14 PROCEDURE — 3609012400 HC EGD TRANSORAL BIOPSY SINGLE/MULTIPLE: Performed by: STUDENT IN AN ORGANIZED HEALTH CARE EDUCATION/TRAINING PROGRAM

## 2024-10-14 PROCEDURE — 3700000000 HC ANESTHESIA ATTENDED CARE: Performed by: STUDENT IN AN ORGANIZED HEALTH CARE EDUCATION/TRAINING PROGRAM

## 2024-10-14 PROCEDURE — 7100000010 HC PHASE II RECOVERY - FIRST 15 MIN: Performed by: STUDENT IN AN ORGANIZED HEALTH CARE EDUCATION/TRAINING PROGRAM

## 2024-10-14 PROCEDURE — 7100000011 HC PHASE II RECOVERY - ADDTL 15 MIN: Performed by: STUDENT IN AN ORGANIZED HEALTH CARE EDUCATION/TRAINING PROGRAM

## 2024-10-14 PROCEDURE — 88312 SPECIAL STAINS GROUP 1: CPT

## 2024-10-14 PROCEDURE — 2709999900 HC NON-CHARGEABLE SUPPLY: Performed by: STUDENT IN AN ORGANIZED HEALTH CARE EDUCATION/TRAINING PROGRAM

## 2024-10-14 PROCEDURE — 2580000003 HC RX 258: Performed by: REGISTERED NURSE

## 2024-10-14 PROCEDURE — 6360000002 HC RX W HCPCS: Performed by: REGISTERED NURSE

## 2024-10-14 PROCEDURE — 88305 TISSUE EXAM BY PATHOLOGIST: CPT

## 2024-10-14 RX ORDER — LIDOCAINE HYDROCHLORIDE 20 MG/ML
INJECTION, SOLUTION EPIDURAL; INFILTRATION; INTRACAUDAL; PERINEURAL
Status: DISCONTINUED | OUTPATIENT
Start: 2024-10-14 | End: 2024-10-14 | Stop reason: SDUPTHER

## 2024-10-14 RX ORDER — SODIUM CHLORIDE, SODIUM LACTATE, POTASSIUM CHLORIDE, CALCIUM CHLORIDE 600; 310; 30; 20 MG/100ML; MG/100ML; MG/100ML; MG/100ML
INJECTION, SOLUTION INTRAVENOUS
Status: DISCONTINUED | OUTPATIENT
Start: 2024-10-14 | End: 2024-10-14 | Stop reason: SDUPTHER

## 2024-10-14 RX ORDER — PROPOFOL 10 MG/ML
INJECTION, EMULSION INTRAVENOUS
Status: DISCONTINUED | OUTPATIENT
Start: 2024-10-14 | End: 2024-10-14 | Stop reason: SDUPTHER

## 2024-10-14 RX ADMIN — PROPOFOL 50 MG: 10 INJECTION, EMULSION INTRAVENOUS at 11:34

## 2024-10-14 RX ADMIN — SODIUM CHLORIDE, SODIUM LACTATE, POTASSIUM CHLORIDE, AND CALCIUM CHLORIDE: 600; 310; 30; 20 INJECTION, SOLUTION INTRAVENOUS at 11:31

## 2024-10-14 RX ADMIN — PROPOFOL 300 MCG/KG/MIN: 10 INJECTION, EMULSION INTRAVENOUS at 11:35

## 2024-10-14 RX ADMIN — LIDOCAINE HYDROCHLORIDE 50 MG: 20 INJECTION, SOLUTION EPIDURAL; INFILTRATION; INTRACAUDAL; PERINEURAL at 11:34

## 2024-10-14 ASSESSMENT — PAIN - FUNCTIONAL ASSESSMENT: PAIN_FUNCTIONAL_ASSESSMENT: 0-10

## 2024-10-14 NOTE — PROGRESS NOTES
Patient discharged. Passing flatus. No acute distress noted. Escorted to car, with family by staff.

## 2024-10-14 NOTE — H&P
Silvia Cervantes is 23 y.o. y/o female here for initial evaluation.      No procedures noted in the chart review.   CT ABDOMEN PELVIS WO CONTRAST  7/2022  FINDINGS:     LOWER CHEST: Unremarkable.     LIVER AND BILIARY: Unremarkable appearance of the unenhanced liver.  No biliary dilation. Gallbladder is unremarkable.     PANCREAS: Unremarkable.     SPLEEN: Unremarkable.     ADRENALS: Unremarkable.     KIDNEYS, URETERS, AND BLADDER: No radiopaque nephrolithiasis or hydronephrosis. Urinary bladder is decompressed.     GI TRACT AND PERITONEUM: No bowel obstruction. Normal appendix. No significant small bowel wall thickening appreciated. The terminal ileum is decompressed, with submucosal fatty deposition, likely indicative of prior inflammation. Wall thickening and submucosal edema of the colon and rectum with mild adjacent inflammatory stranding and small volume pelvic free fluid. No organized fluid collection. No free intraperitoneal gas.     VASCULATURE: No abdominal aortic aneurysm.     LYMPH NODES: No lymphadenopathy.     REPRODUCTIVE ORGANS: Unremarkable.     MUSCULOSKELETAL: Well-circumscribed cystic structure along the left aspect of the labia measuring 3.0 x 2.2 cm, likely a Bartholin gland cyst. No acute bony abnormality.      She reports diagnosis of Crohn's disease at age 6, between age [age] she was managed with Asacol.  Then she reports that she was in remission between age [age] however this was based on only symptoms per her report.  Then in the last 2 years she has been having slowly worsening symptoms along with flares intermittently.  Currently she is having significant abdominal cramping, ongoing bloody diarrhea, noted recent ER visit with low hemoglobin that required transfusion.           Lab Results   Component Value Date     HGB 6.7 (LL) 09/09/2024     WBC 8.8 09/09/2024      (H) 09/09/2024     MCV 67.7 (L) 09/09/2024     CREATININE 0.56 (L) 09/09/2024     ALT 16 09/09/2024     AST 22

## 2024-10-14 NOTE — PROGRESS NOTES
EGD; A. Small bowel,B. Gastric bxs, C. GE junction bxs.  Colonoscopy;D. Left colon bxs r/o ulcerative colitis, left sided colitis,   Patient showing no visible signs of pain or distress. Anesthesia provider at bedside,see notes. Report given to GI recovery nurse.

## 2024-10-14 NOTE — ANESTHESIA POSTPROCEDURE EVALUATION
Department of Anesthesiology  Postprocedure Note    Patient: Silvia Cervantes  MRN: [id number]  YOB: 2000  Date of evaluation: 10/14/2024    Procedure Summary       Date: 10/14/24 Room / Location: [facility] 04 / [facility] ENDOSCOPY    Anesthesia Start: 1131 Anesthesia Stop: 1200    Procedures:       COLONOSCOPY BIOPSY      ESOPHAGOGASTRODUODENOSCOPY BIOPSY (Upper GI Region) Diagnosis:       Crohn's disease (HCC)      (Crohn's disease (HCC) [K50.90])    Surgeons: Irvin Carnes MD Responsible Provider: Howard Krishna MD    Anesthesia Type: TIVA ASA Status: 2            Anesthesia Type: TIVA    Alyce Phase I: Alyce Score: 10    Alyce Phase II: Alyce Score: 10    Anesthesia Post Evaluation    Patient location during evaluation: bedside  Patient participation: complete - patient participated  Level of consciousness: awake and alert  Airway patency: patent  Nausea & Vomiting: no nausea  Cardiovascular status: hemodynamically stable  Respiratory status: acceptable, nonlabored ventilation and spontaneous ventilation  Hydration status: euvolemic    No notable events documented.

## 2024-10-14 NOTE — DISCHARGE INSTRUCTIONS
Gastrointestinal Colonoscopy/Flexible Sigmoidoscopy - Lower Exam Discharge Instructions  Call Dr. Carnes at [phone] for any problems or questions.  Contact the doctor’s office for follow up appointment as directed  Medication may cause drowsiness for several hours, therefore, do not drive or operate machinery for remainder of the day.  No alcohol today.  Do not make any important decisions such signing legal paperwork.  Ordinarily, you may resume regular diet and activity after exam unless otherwise specified by your physician.  Because of air put into your colon during exam, you may experience some abdominal distension, relieved by the passage of gas, for several hours.  Contact your physician if you have any of the following:  Excessive amount of bleeding - large amount when having a bowel movement.  Occasional specks of blood with bowel movement would not be unusual.  Severe abdominal pain  Fever or Chills    Gastrointestinal Esophagogastroduodenoscopy (EGD) - Upper Exam Discharge Instructions     For mild soreness in your throat you may use Cepacol throat lozenges or warm salt-water gargles as needed.      Instructions given to Silvia Cervantes and other family members.

## 2024-10-15 RX ORDER — MESALAMINE 1000 MG/1
1000 SUPPOSITORY RECTAL NIGHTLY
Qty: 14 SUPPOSITORY | Refills: 0 | Status: SHIPPED | OUTPATIENT
Start: 2024-10-15 | End: 2024-10-29

## 2024-10-16 ENCOUNTER — TELEPHONE (OUTPATIENT)
Dept: GASTROENTEROLOGY | Age: 24
End: 2024-10-16

## 2024-10-16 NOTE — TELEPHONE ENCOUNTER
Called pt and left voicemail advising them to return call to inform the office which medication she would prefer for confirmed diagnosis of Ulcerative colitis. Per Dr. Carnes:     \"Potential medications include Azathiopurine, Rinvoq, Vedolizumab, Skyrizi, Infliximab\"  ----- Message from Dr. Irvin Carnes MD sent at 10/16/2024  8:57 AM EDT -----  We can let her know that biopsies confirm the diagnosis, and she needs to let us know which drug she wants us to proceed with, see colonoscopy report.

## 2024-10-22 ENCOUNTER — TELEPHONE (OUTPATIENT)
Dept: GASTROENTEROLOGY | Age: 24
End: 2024-10-22

## 2024-10-22 NOTE — TELEPHONE ENCOUNTER
Patient called to inform Dr. Carnes she decided on which Ulcerative Colitis medication she would like to try. The patient decided on Rinvoq. Told patient that I would get message to the RN and we would have it sent to pharmacy for her. Patient verbalized understanding.

## 2024-10-24 ENCOUNTER — TELEPHONE (OUTPATIENT)
Dept: GASTROENTEROLOGY | Age: 24
End: 2024-10-24

## 2024-10-24 NOTE — TELEPHONE ENCOUNTER
Jolanta from [facility] called and stated that the patient's Rinvoq form was not filled out fully. They need an amount. Call back number [phone].

## 2024-10-25 ENCOUNTER — TELEPHONE (OUTPATIENT)
Dept: GASTROENTEROLOGY | Age: 24
End: 2024-10-25

## 2024-10-25 NOTE — TELEPHONE ENCOUNTER
Contacted [facility] at [phone]. Was informed that Rinvoq prescription was missing dose information and signature. Sent fax with updated required information to [facility] at [phone].

## 2024-11-19 DIAGNOSIS — K50.919 CROHN'S DISEASE WITH COMPLICATION, UNSPECIFIED GASTROINTESTINAL TRACT LOCATION (HCC): Primary | ICD-10-CM

## 2024-11-26 ENCOUNTER — TELEPHONE (OUTPATIENT)
Dept: GASTROENTEROLOGY | Age: 24
End: 2024-11-26

## 2024-11-26 NOTE — TELEPHONE ENCOUNTER
Called [facility] at [phone], extension [phone] on behalf of the pt for ordered Rinvoq. I spoke with a benefits , [place], who informed me that it appears they may be able to complete a prior authorization on the pt's behalf for the Rinvoq. They are going to contact the pt's insurance and attempt to secure a PA for that medication and fax our office the determination. We have not received any requests from [employer] as of yet.    Replied to pt's [employer] message about [employer] and informed her of all the aforementioned information.

## 2024-12-11 NOTE — TELEPHONE ENCOUNTER
Patient informed of information below.   Patient will follow up tomorrow   
Returned call to patient spoke with mother Blossom she had questions about her prep and arrival time for surgery with Trice on 10/14/2024  
None

## 2024-12-30 ENCOUNTER — APPOINTMENT (OUTPATIENT)
Dept: CT IMAGING | Age: 24
End: 2024-12-30

## 2024-12-30 ENCOUNTER — HOSPITAL ENCOUNTER (EMERGENCY)
Age: 24
Discharge: HOME | End: 2024-12-30
Attending: EMERGENCY MEDICINE

## 2024-12-30 VITALS
RESPIRATION RATE: 18 BRPM | OXYGEN SATURATION: 100 % | SYSTOLIC BLOOD PRESSURE: 146 MMHG | WEIGHT: 178 LBS | HEART RATE: 68 BPM | TEMPERATURE: 98.7 F | HEIGHT: 65 IN | BODY MASS INDEX: 29.66 KG/M2 | DIASTOLIC BLOOD PRESSURE: 76 MMHG

## 2024-12-30 DIAGNOSIS — K62.89 RECTAL PAIN: Primary | ICD-10-CM

## 2024-12-30 DIAGNOSIS — K50.919 CROHN'S DISEASE WITH COMPLICATION, UNSPECIFIED GASTROINTESTINAL TRACT LOCATION (HCC): ICD-10-CM

## 2024-12-30 DIAGNOSIS — D50.9 IRON DEFICIENCY ANEMIA, UNSPECIFIED IRON DEFICIENCY ANEMIA TYPE: ICD-10-CM

## 2024-12-30 DIAGNOSIS — K64.8 INTERNAL HEMORRHOIDS: ICD-10-CM

## 2024-12-30 LAB
ALBUMIN SERPL-MCNC: 4.1 G/DL (ref 3.5–5)
ALBUMIN/GLOB SERPL: 1.2 (ref 1–1.9)
ALP SERPL-CCNC: 54 U/L (ref 35–104)
ALT SERPL-CCNC: 9 U/L (ref 8–45)
ANION GAP SERPL CALC-SCNC: 12 MMOL/L (ref 7–16)
AST SERPL-CCNC: 21 U/L (ref 15–37)
BACTERIA URNS QL MICRO: NEGATIVE /HPF
BASOPHILS # BLD: 0.1 K/UL (ref 0–0.2)
BASOPHILS NFR BLD: 1 % (ref 0–2)
BILIRUB SERPL-MCNC: 0.3 MG/DL (ref 0–1.2)
BILIRUB UR QL: NEGATIVE
BUN SERPL-MCNC: 10 MG/DL (ref 6–23)
CALCIUM SERPL-MCNC: 9.3 MG/DL (ref 8.8–10.2)
CHLORIDE SERPL-SCNC: 103 MMOL/L (ref 98–107)
CO2 SERPL-SCNC: 22 MMOL/L (ref 20–29)
CREAT SERPL-MCNC: 0.61 MG/DL (ref 0.6–1.1)
DIFFERENTIAL METHOD BLD: ABNORMAL
EOSINOPHIL # BLD: 0.1 K/UL (ref 0–0.8)
EOSINOPHIL NFR BLD: 1 % (ref 0.5–7.8)
EPI CELLS #/AREA URNS HPF: ABNORMAL /HPF
ERYTHROCYTE [DISTWIDTH] IN BLOOD BY AUTOMATED COUNT: 14.5 % (ref 11.9–14.6)
GLOBULIN SER CALC-MCNC: 3.4 G/DL (ref 2.3–3.5)
GLUCOSE SERPL-MCNC: 89 MG/DL (ref 70–99)
GLUCOSE UR QL STRIP.AUTO: NEGATIVE MG/DL
HCG, URINE, POC: NEGATIVE
HCT VFR BLD AUTO: 30 % (ref 35.8–46.3)
HGB BLD-MCNC: 9.6 G/DL (ref 11.7–15.4)
HYALINE CASTS URNS QL MICRO: ABNORMAL /LPF
IMM GRANULOCYTES # BLD AUTO: 0 K/UL (ref 0–0.5)
IMM GRANULOCYTES NFR BLD AUTO: 1 % (ref 0–5)
KETONES UR-MCNC: NEGATIVE MG/DL
LACTATE SERPL-SCNC: 1.1 MMOL/L (ref 0.5–2)
LEUKOCYTE ESTERASE UR QL STRIP: ABNORMAL
LYMPHOCYTES # BLD: 3 K/UL (ref 0.5–4.6)
LYMPHOCYTES NFR BLD: 46 % (ref 13–44)
Lab: NORMAL
MCH RBC QN AUTO: 26 PG (ref 26.1–32.9)
MCHC RBC AUTO-ENTMCNC: 32 G/DL (ref 31.4–35)
MCV RBC AUTO: 81.3 FL (ref 82–102)
MONOCYTES # BLD: 0.6 K/UL (ref 0.1–1.3)
MONOCYTES NFR BLD: 9 % (ref 4–12)
NEGATIVE QC PASS/FAIL: NORMAL
NEUTS SEG # BLD: 2.8 K/UL (ref 1.7–8.2)
NEUTS SEG NFR BLD: 43 % (ref 43–78)
NITRITE UR QL: NEGATIVE
NRBC # BLD: 0 K/UL (ref 0–0.2)
PH UR: 6.5 (ref 5–9)
PLATELET # BLD AUTO: 471 K/UL (ref 150–450)
PMV BLD AUTO: 8.7 FL (ref 9.4–12.3)
POSITIVE QC PASS/FAIL: NORMAL
POTASSIUM SERPL-SCNC: 4.1 MMOL/L (ref 3.5–5.1)
PROT SERPL-MCNC: 7.5 G/DL (ref 6.3–8.2)
PROT UR QL: NEGATIVE MG/DL
RBC # BLD AUTO: 3.69 M/UL (ref 4.05–5.2)
RBC # UR STRIP: ABNORMAL
RBC #/AREA URNS HPF: ABNORMAL /HPF
SERVICE CMNT-IMP: ABNORMAL
SODIUM SERPL-SCNC: 137 MMOL/L (ref 136–145)
SP GR UR: 1.01 (ref 1–1.02)
UROBILINOGEN UR QL: 0.2 EU/DL (ref 0.2–1)
WBC # BLD AUTO: 6.6 K/UL (ref 4.3–11.1)
WBC URNS QL MICRO: ABNORMAL /HPF

## 2024-12-30 PROCEDURE — 80053 COMPREHEN METABOLIC PANEL: CPT

## 2024-12-30 PROCEDURE — 85025 COMPLETE CBC W/AUTO DIFF WBC: CPT

## 2024-12-30 PROCEDURE — 83605 ASSAY OF LACTIC ACID: CPT

## 2024-12-30 PROCEDURE — 6370000000 HC RX 637 (ALT 250 FOR IP): Performed by: EMERGENCY MEDICINE

## 2024-12-30 PROCEDURE — 99285 EMERGENCY DEPT VISIT HI MDM: CPT

## 2024-12-30 PROCEDURE — 81001 URINALYSIS AUTO W/SCOPE: CPT

## 2024-12-30 PROCEDURE — 6360000004 HC RX CONTRAST MEDICATION: Performed by: EMERGENCY MEDICINE

## 2024-12-30 PROCEDURE — 6360000002 HC RX W HCPCS: Performed by: EMERGENCY MEDICINE

## 2024-12-30 PROCEDURE — 96374 THER/PROPH/DIAG INJ IV PUSH: CPT

## 2024-12-30 PROCEDURE — 74177 CT ABD & PELVIS W/CONTRAST: CPT

## 2024-12-30 RX ORDER — IOPAMIDOL 755 MG/ML
100 INJECTION, SOLUTION INTRAVASCULAR
Status: COMPLETED | OUTPATIENT
Start: 2024-12-30 | End: 2024-12-30

## 2024-12-30 RX ORDER — HYDROCORTISONE 25 MG/G
CREAM TOPICAL ONCE
Status: COMPLETED | OUTPATIENT
Start: 2024-12-30 | End: 2024-12-30

## 2024-12-30 RX ORDER — HYDROMORPHONE HYDROCHLORIDE 1 MG/ML
0.5 INJECTION, SOLUTION INTRAMUSCULAR; INTRAVENOUS; SUBCUTANEOUS
Status: COMPLETED | OUTPATIENT
Start: 2024-12-30 | End: 2024-12-30

## 2024-12-30 RX ORDER — HYDROCODONE BITARTRATE AND ACETAMINOPHEN 5; 325 MG/1; MG/1
1 TABLET ORAL EVERY 8 HOURS PRN
Qty: 9 TABLET | Refills: 0 | Status: ON HOLD | OUTPATIENT
Start: 2024-12-30 | End: 2025-01-04 | Stop reason: HOSPADM

## 2024-12-30 RX ADMIN — IOPAMIDOL 100 ML: 755 INJECTION, SOLUTION INTRAVENOUS at 13:59

## 2024-12-30 RX ADMIN — HYDROMORPHONE HYDROCHLORIDE 0.5 MG: 1 INJECTION, SOLUTION INTRAMUSCULAR; INTRAVENOUS; SUBCUTANEOUS at 13:57

## 2024-12-30 RX ADMIN — HYDROCORTISONE: 25 CREAM TOPICAL at 13:58

## 2024-12-30 ASSESSMENT — PAIN DESCRIPTION - LOCATION
LOCATION: RECTUM
LOCATION: RECTUM

## 2024-12-30 ASSESSMENT — LIFESTYLE VARIABLES
HOW MANY STANDARD DRINKS CONTAINING ALCOHOL DO YOU HAVE ON A TYPICAL DAY: 1 OR 2
HOW OFTEN DO YOU HAVE A DRINK CONTAINING ALCOHOL: 2-4 TIMES A MONTH

## 2024-12-30 ASSESSMENT — ENCOUNTER SYMPTOMS
ANAL BLEEDING: 1
RECTAL PAIN: 1

## 2024-12-30 ASSESSMENT — PAIN DESCRIPTION - PAIN TYPE: TYPE: ACUTE PAIN

## 2024-12-30 ASSESSMENT — PAIN SCALES - GENERAL
PAINLEVEL_OUTOF10: 8
PAINLEVEL_OUTOF10: 7

## 2024-12-30 ASSESSMENT — PAIN - FUNCTIONAL ASSESSMENT: PAIN_FUNCTIONAL_ASSESSMENT: 0-10

## 2024-12-30 NOTE — ED PROVIDER NOTES
Emergency Department Provider Note       PCP: No primary care provider on file.   Age: 24 y.o.   Sex: female     DISPOSITION Decision To Discharge 12/30/2024 02:33:31 PM    ICD-10-CM    1. Rectal pain  K62.89 HYDROcodone-acetaminophen (NORCO) 5-325 MG per tablet      2. Internal hemorrhoids  K64.8       3. Iron deficiency anemia, unspecified iron deficiency anemia type  D50.9       4. Crohn's disease with complication, unspecified gastrointestinal tract location (HCC)  K50.919           Medical Decision Making     Ddx:    Intra-abdominal abscess, Crohn's disease, perianal abscess, thrombosed hemorrhoid, sepsis, anemia,  ED Course as of 12/30/24 1436   Mon Dec 30, 2024   1431 I talked to the patient about the findings in the emergency department.  She is feeling better after the pain medication was given to her.  The patient also got the Anusol cream and I encouraged her to continue using this.  We talked about using sitz bath's and I asked her to talk to her family doctor about trying to schedule outpatient iron infusions since the oral iron makes her constipated.  She is agreeable this plan. [KH]      ED Course User Index  [KH] Sergio Mahmood, DO     1 or more chronic illnesses with a severe exacerbation or progression.  Prescription drug management performed.  Shared medical decision making was utilized in creating the patients health plan today.  I independently ordered and reviewed each unique test.    I reviewed external records: ED visit note from a different ED.   I reviewed external records: provider visit note from PCP.  I reviewed external records: provider visit note from outside specialist.  I reviewed external records: previous EKG including cardiologist interpretation.    I reviewed external records: previous lab results from outside ED.  I reviewed external records: previous imaging study including radiologist interpretation.       I interpreted the CT Scan no free air.              History

## 2024-12-30 NOTE — DISCHARGE INSTRUCTIONS
Use the Anusol cream 2-3 times daily over the affected area to help with pain.  Continue using Epsom salt sitz bath's.  Talk to your family doctor about referral for iron infusion since oral iron has been constipating.    Take the least amount of narcotic pain medicine possible for control of severe pain.  Risk of opioid analgesics include, but are not limited to: Overdose they can stop or slow your breathing and lead to death; fractures from falls; drowsiness leading to injury; tolerance, dependence and addiction. You should not operate any motorized vehicles or work from a height greater than ground level when taking opioid analgesics as they increase your fall risks.

## 2024-12-30 NOTE — ED TRIAGE NOTES
Patient to triage with c/o reputing a hemorrhoid on 12/27, and says she was at work and felt like she was going to do it again.

## 2025-01-02 ENCOUNTER — APPOINTMENT (OUTPATIENT)
Dept: CT IMAGING | Age: 25
DRG: 394 | End: 2025-01-02

## 2025-01-02 ENCOUNTER — HOSPITAL ENCOUNTER (INPATIENT)
Age: 25
LOS: 2 days | Discharge: HOME | DRG: 394 | End: 2025-01-04
Attending: EMERGENCY MEDICINE

## 2025-01-02 DIAGNOSIS — Z87.19 HISTORY OF CROHN'S DISEASE: ICD-10-CM

## 2025-01-02 DIAGNOSIS — K62.5 HEMORRHAGE OF RECTUM AND ANUS: ICD-10-CM

## 2025-01-02 DIAGNOSIS — K61.1 PERIRECTAL ABSCESS: Primary | ICD-10-CM

## 2025-01-02 PROBLEM — E28.2 PCOS (POLYCYSTIC OVARIAN SYNDROME): Status: ACTIVE | Noted: 2025-01-02

## 2025-01-02 PROBLEM — D64.9 CHRONIC ANEMIA: Status: ACTIVE | Noted: 2025-01-02

## 2025-01-02 LAB
ABO + RH BLD: NORMAL
BLOOD GROUP ANTIBODIES SERPL: NORMAL
CRP SERPL-MCNC: <0.3 MG/DL (ref 0–0.4)
ERYTHROCYTE [DISTWIDTH] IN BLOOD BY AUTOMATED COUNT: 14.8 % (ref 11.9–14.6)
ERYTHROCYTE [SEDIMENTATION RATE] IN BLOOD: 8 MM/HR (ref 0–20)
HCG UR QL: NEGATIVE
HCT VFR BLD AUTO: 30.9 % (ref 35.8–46.3)
HGB BLD-MCNC: 9.8 G/DL (ref 11.7–15.4)
MCH RBC QN AUTO: 26.2 PG (ref 26.1–32.9)
MCHC RBC AUTO-ENTMCNC: 31.7 G/DL (ref 31.4–35)
MCV RBC AUTO: 82.6 FL (ref 82–102)
NRBC # BLD: 0 K/UL (ref 0–0.2)
PLATELET # BLD AUTO: 371 K/UL (ref 150–450)
PMV BLD AUTO: 9.1 FL (ref 9.4–12.3)
RBC # BLD AUTO: 3.74 M/UL (ref 4.05–5.2)
SERVICE CMNT-IMP: NORMAL
SPECIMEN EXP DATE BLD: NORMAL
WBC # BLD AUTO: 8.7 K/UL (ref 4.3–11.1)
WET PREP GENITAL: NORMAL
WET PREP GENITAL: NORMAL

## 2025-01-02 PROCEDURE — 99285 EMERGENCY DEPT VISIT HI MDM: CPT

## 2025-01-02 PROCEDURE — 1100000000 HC RM PRIVATE

## 2025-01-02 PROCEDURE — 74177 CT ABD & PELVIS W/CONTRAST: CPT

## 2025-01-02 PROCEDURE — 81025 URINE PREGNANCY TEST: CPT

## 2025-01-02 PROCEDURE — 6360000004 HC RX CONTRAST MEDICATION: Performed by: EMERGENCY MEDICINE

## 2025-01-02 PROCEDURE — 2500000003 HC RX 250 WO HCPCS

## 2025-01-02 PROCEDURE — 86901 BLOOD TYPING SEROLOGIC RH(D): CPT

## 2025-01-02 PROCEDURE — 2580000003 HC RX 258: Performed by: EMERGENCY MEDICINE

## 2025-01-02 PROCEDURE — 87210 SMEAR WET MOUNT SALINE/INK: CPT

## 2025-01-02 PROCEDURE — 2500000003 HC RX 250 WO HCPCS: Performed by: EMERGENCY MEDICINE

## 2025-01-02 PROCEDURE — 86900 BLOOD TYPING SEROLOGIC ABO: CPT

## 2025-01-02 PROCEDURE — 87591 N.GONORRHOEAE DNA AMP PROB: CPT

## 2025-01-02 PROCEDURE — 6370000000 HC RX 637 (ALT 250 FOR IP)

## 2025-01-02 PROCEDURE — 86850 RBC ANTIBODY SCREEN: CPT

## 2025-01-02 PROCEDURE — 6360000002 HC RX W HCPCS: Performed by: EMERGENCY MEDICINE

## 2025-01-02 PROCEDURE — 96374 THER/PROPH/DIAG INJ IV PUSH: CPT

## 2025-01-02 PROCEDURE — 86140 C-REACTIVE PROTEIN: CPT

## 2025-01-02 PROCEDURE — 96375 TX/PRO/DX INJ NEW DRUG ADDON: CPT

## 2025-01-02 PROCEDURE — 85652 RBC SED RATE AUTOMATED: CPT

## 2025-01-02 PROCEDURE — 85027 COMPLETE CBC AUTOMATED: CPT

## 2025-01-02 PROCEDURE — 99254 IP/OBS CNSLTJ NEW/EST MOD 60: CPT | Performed by: INTERNAL MEDICINE

## 2025-01-02 PROCEDURE — 87491 CHLMYD TRACH DNA AMP PROBE: CPT

## 2025-01-02 RX ORDER — HYDROMORPHONE HYDROCHLORIDE 1 MG/ML
0.5 INJECTION, SOLUTION INTRAMUSCULAR; INTRAVENOUS; SUBCUTANEOUS
Status: COMPLETED | OUTPATIENT
Start: 2025-01-02 | End: 2025-01-02

## 2025-01-02 RX ORDER — SODIUM CHLORIDE, SODIUM LACTATE, POTASSIUM CHLORIDE, CALCIUM CHLORIDE 600; 310; 30; 20 MG/100ML; MG/100ML; MG/100ML; MG/100ML
INJECTION, SOLUTION INTRAVENOUS CONTINUOUS
Status: DISCONTINUED | OUTPATIENT
Start: 2025-01-02 | End: 2025-01-03

## 2025-01-02 RX ORDER — SODIUM CHLORIDE 0.9 % (FLUSH) 0.9 %
5-40 SYRINGE (ML) INJECTION EVERY 12 HOURS SCHEDULED
Status: DISCONTINUED | OUTPATIENT
Start: 2025-01-02 | End: 2025-01-04 | Stop reason: HOSPADM

## 2025-01-02 RX ORDER — ONDANSETRON 4 MG/1
4 TABLET, ORALLY DISINTEGRATING ORAL EVERY 8 HOURS PRN
Status: DISCONTINUED | OUTPATIENT
Start: 2025-01-02 | End: 2025-01-04 | Stop reason: HOSPADM

## 2025-01-02 RX ORDER — ONDANSETRON 2 MG/ML
4 INJECTION INTRAMUSCULAR; INTRAVENOUS
Status: COMPLETED | OUTPATIENT
Start: 2025-01-02 | End: 2025-01-02

## 2025-01-02 RX ORDER — ACETAMINOPHEN 650 MG/1
650 SUPPOSITORY RECTAL EVERY 6 HOURS PRN
Status: DISCONTINUED | OUTPATIENT
Start: 2025-01-02 | End: 2025-01-04

## 2025-01-02 RX ORDER — POTASSIUM CHLORIDE 7.45 MG/ML
10 INJECTION INTRAVENOUS PRN
Status: DISCONTINUED | OUTPATIENT
Start: 2025-01-02 | End: 2025-01-04 | Stop reason: HOSPADM

## 2025-01-02 RX ORDER — ONDANSETRON 2 MG/ML
4 INJECTION INTRAMUSCULAR; INTRAVENOUS EVERY 6 HOURS PRN
Status: DISCONTINUED | OUTPATIENT
Start: 2025-01-02 | End: 2025-01-04 | Stop reason: HOSPADM

## 2025-01-02 RX ORDER — HYDROMORPHONE HYDROCHLORIDE 1 MG/ML
1 INJECTION, SOLUTION INTRAMUSCULAR; INTRAVENOUS; SUBCUTANEOUS EVERY 4 HOURS PRN
Status: DISCONTINUED | OUTPATIENT
Start: 2025-01-02 | End: 2025-01-04

## 2025-01-02 RX ORDER — DIATRIZOATE MEGLUMINE AND DIATRIZOATE SODIUM 660; 100 MG/ML; MG/ML
60 SOLUTION ORAL; RECTAL
Status: DISCONTINUED | OUTPATIENT
Start: 2025-01-02 | End: 2025-01-04 | Stop reason: HOSPADM

## 2025-01-02 RX ORDER — POTASSIUM CHLORIDE 1500 MG/1
40 TABLET, EXTENDED RELEASE ORAL PRN
Status: DISCONTINUED | OUTPATIENT
Start: 2025-01-02 | End: 2025-01-04 | Stop reason: HOSPADM

## 2025-01-02 RX ORDER — MAGNESIUM SULFATE IN WATER 40 MG/ML
2000 INJECTION, SOLUTION INTRAVENOUS PRN
Status: DISCONTINUED | OUTPATIENT
Start: 2025-01-02 | End: 2025-01-04 | Stop reason: HOSPADM

## 2025-01-02 RX ORDER — SODIUM CHLORIDE 9 MG/ML
INJECTION, SOLUTION INTRAVENOUS PRN
Status: DISCONTINUED | OUTPATIENT
Start: 2025-01-02 | End: 2025-01-04 | Stop reason: HOSPADM

## 2025-01-02 RX ORDER — IOPAMIDOL 755 MG/ML
100 INJECTION, SOLUTION INTRAVASCULAR
Status: COMPLETED | OUTPATIENT
Start: 2025-01-02 | End: 2025-01-02

## 2025-01-02 RX ORDER — ACETAMINOPHEN 325 MG/1
650 TABLET ORAL EVERY 6 HOURS PRN
Status: DISCONTINUED | OUTPATIENT
Start: 2025-01-02 | End: 2025-01-04

## 2025-01-02 RX ORDER — DIATRIZOATE MEGLUMINE AND DIATRIZOATE SODIUM 660; 100 MG/ML; MG/ML
15 SOLUTION ORAL; RECTAL
Status: DISCONTINUED | OUTPATIENT
Start: 2025-01-02 | End: 2025-01-04 | Stop reason: HOSPADM

## 2025-01-02 RX ORDER — POLYETHYLENE GLYCOL 3350 17 G/17G
17 POWDER, FOR SOLUTION ORAL DAILY PRN
Status: DISCONTINUED | OUTPATIENT
Start: 2025-01-02 | End: 2025-01-04 | Stop reason: HOSPADM

## 2025-01-02 RX ORDER — SODIUM CHLORIDE 0.9 % (FLUSH) 0.9 %
5-40 SYRINGE (ML) INJECTION PRN
Status: DISCONTINUED | OUTPATIENT
Start: 2025-01-02 | End: 2025-01-04 | Stop reason: HOSPADM

## 2025-01-02 RX ADMIN — IOPAMIDOL 100 ML: 755 INJECTION, SOLUTION INTRAVENOUS at 09:46

## 2025-01-02 RX ADMIN — ONDANSETRON 4 MG: 2 INJECTION INTRAMUSCULAR; INTRAVENOUS at 10:24

## 2025-01-02 RX ADMIN — HYDROMORPHONE HYDROCHLORIDE 0.5 MG: 1 INJECTION, SOLUTION INTRAMUSCULAR; INTRAVENOUS; SUBCUTANEOUS at 12:54

## 2025-01-02 RX ADMIN — SODIUM CHLORIDE, POTASSIUM CHLORIDE, SODIUM LACTATE AND CALCIUM CHLORIDE: 600; 310; 30; 20 INJECTION, SOLUTION INTRAVENOUS at 12:56

## 2025-01-02 RX ADMIN — DIATRIZOATE MEGLUMINE AND DIATRIZOATE SODIUM 30 ML: 660; 100 LIQUID ORAL; RECTAL at 09:51

## 2025-01-02 RX ADMIN — LIDOCAINE HYDROCHLORIDE 5 ML: 10 INJECTION, SOLUTION INFILTRATION; PERINEURAL at 12:30

## 2025-01-02 RX ADMIN — ACETAMINOPHEN 650 MG: 325 TABLET ORAL at 20:27

## 2025-01-02 RX ADMIN — DIATRIZOATE MEGLUMINE AND DIATRIZOATE SODIUM 15 ML: 660; 100 LIQUID ORAL; RECTAL at 08:48

## 2025-01-02 RX ADMIN — ONDANSETRON 4 MG: 2 INJECTION INTRAMUSCULAR; INTRAVENOUS at 12:53

## 2025-01-02 RX ADMIN — HYDROMORPHONE HYDROCHLORIDE 0.5 MG: 1 INJECTION, SOLUTION INTRAMUSCULAR; INTRAVENOUS; SUBCUTANEOUS at 10:24

## 2025-01-02 RX ADMIN — SODIUM CHLORIDE, POTASSIUM CHLORIDE, SODIUM LACTATE AND CALCIUM CHLORIDE: 600; 310; 30; 20 INJECTION, SOLUTION INTRAVENOUS at 22:54

## 2025-01-02 ASSESSMENT — PAIN DESCRIPTION - FREQUENCY: FREQUENCY: INTERMITTENT

## 2025-01-02 ASSESSMENT — ENCOUNTER SYMPTOMS
BLOOD IN STOOL: 1
VOMITING: 1
ABDOMINAL PAIN: 0
RECTAL PAIN: 1
NAUSEA: 1
DIARRHEA: 1

## 2025-01-02 ASSESSMENT — PAIN DESCRIPTION - DESCRIPTORS
DESCRIPTORS: SHARP;THROBBING
DESCRIPTORS: DISCOMFORT
DESCRIPTORS: ACHING;DISCOMFORT

## 2025-01-02 ASSESSMENT — PAIN - FUNCTIONAL ASSESSMENT
PAIN_FUNCTIONAL_ASSESSMENT: 0-10
PAIN_FUNCTIONAL_ASSESSMENT: PREVENTS OR INTERFERES SOME ACTIVE ACTIVITIES AND ADLS

## 2025-01-02 ASSESSMENT — PAIN SCALES - GENERAL
PAINLEVEL_OUTOF10: 4
PAINLEVEL_OUTOF10: 6
PAINLEVEL_OUTOF10: 10
PAINLEVEL_OUTOF10: 3

## 2025-01-02 ASSESSMENT — PAIN DESCRIPTION - LOCATION
LOCATION: RECTUM
LOCATION: RECTUM
LOCATION: BUTTOCKS;HEAD

## 2025-01-02 ASSESSMENT — PAIN DESCRIPTION - ONSET: ONSET: GRADUAL

## 2025-01-02 ASSESSMENT — PAIN DESCRIPTION - ORIENTATION
ORIENTATION: MID
ORIENTATION: POSTERIOR

## 2025-01-02 ASSESSMENT — PAIN DESCRIPTION - PAIN TYPE: TYPE: ACUTE PAIN

## 2025-01-02 NOTE — ED TRIAGE NOTES
Patient arrives ambulatory to triage from [facility]. Reports hernia rupture and rectal bleeding. Hx Crohns. Arrives with 20G IV in right AC.

## 2025-01-02 NOTE — PROGRESS NOTES
TRANSFER - IN REPORT:    Verbal report received from KATARINA Mcclellan on Silvia Cervantes  being received from ED for routine progression of patient care      Report consisted of patient's Situation, Background, Assessment and   Recommendations(SBAR).     Information from the following report(s) Nurse Handoff Report was reviewed with the receiving nurse.    Opportunity for questions and clarification was provided.      Assessment completed upon patient's arrival to unit and care assumed.

## 2025-01-02 NOTE — ED PROVIDER NOTES
Emergency Department Provider Note       PCP: No primary care provider on file.   Age: 24 y.o.   Sex: female     DISPOSITION Decision To Admit 01/02/2025 12:22:48 PM    ICD-10-CM    1. Perirectal abscess  K61.1       2. Hemorrhage of rectum and anus  K62.5       3. History of Crohn's disease  Z87.19           Medical Decision Making     Spoke with Dr. Nielsen who is concerned about perirectal abscess.  There was no pathology on CT done on the 30th.  I will repeat with oral and rectal contrast as it does not seem the patient has had an excessive number of CT scan images Dr. Nielsen requesting admission by the hospitalist service.  Will recheck hemoglobin and chemistries    12:25 PM  Hemoglobin stable.  CT did not show obvious perirectal abscess but was read as a left-sided Bartholin cyst abscess.  On clinical exam she does not have any vaginal tenderness or fluctuance consistent with Bartholin cyst.  She does not fact have significant right-sided perirectal tenderness with expression of blood from the rectum with palpation in this area.  Hospitalist will be asked to admit with general surgery and gastroenterology consultations     1 or more acute illnesses that pose a threat to life or bodily function.   Parental controlled substances given in the ED.  Discussion with external consultants.  Shared medical decision making was utilized in creating the patients health plan today.  I independently ordered and reviewed each unique test.    I reviewed external records: ED visit note from a different ED.   I reviewed external records: previous imaging study including radiologist interpretation.       I interpreted the CT Scan Bartholin versus perirectal fluid collection.    The patient was admitted and I have discussed patient management with the admitting provider.    Exclusion criteria - the patient is NOT to be included for SEP-1 Core Measure due to: 2+ SIRS criteria are not met       History     Transferred here from  Frequency of Social Gatherings with Friends and Family: Not asked   Intimate Partner Violence: Unknown (3/20/2021)    Received from [facility]    Intimate Partner Violence     Fear of Current or Ex-Partner: Not asked     Emotionally Abused: Not asked     Physically Abused: Not asked     Sexually Abused: Not asked   Housing Stability: Not At Risk (3/10/2022)    Received from [facility]    Housing Stability     Was there a time when you did not have a steady place to sleep: Not asked     Worried that the place you are staying is making you sick: Not asked        Previous Medications    FERROUS SULFATE (IRON 325) 325 (65 FE) MG TABLET    Take 1 tablet by mouth daily (with breakfast)    HYDROCODONE-ACETAMINOPHEN (NORCO) 5-325 MG PER TABLET    Take 1 tablet by mouth every 8 hours as needed for Pain for up to 3 days. Take lowest dose possible to manage pain Max Daily Amount: 3 tablets    MEDROXYPROGESTERONE (PROVERA) 5 MG TABLET    Take 1 tablet by mouth daily    MESALAMINE (CANASA) 1000 MG SUPPOSITORY    Place 1 suppository rectally nightly for 14 days    PANTOPRAZOLE (PROTONIX) 40 MG TABLET    Take 1 tablet by mouth every morning (before breakfast)    UPADACITINIB ER 45 MG TB24    Take 1 tablet by mouth daily        Results from this emergency department visit:      Results for orders placed or performed during the hospital encounter of 01/02/25   CT ABDOMEN PELVIS W IV CONTRAST Additional Contrast? Oral and Rectal    Narrative    CT OF THE ABDOMEN AND PELVIS    INDICATION: crohn's, perirectal pain-abscess?    TECHNIQUE: Multiple 2D axial images were obtained through the abdomen and  pelvis.  Oral and rectal contrast was used for bowel opacification.  100mL of  Isovue 370 intravenous contrast was used for better evaluation of solid organs  and vascular structures.  Radiation dose reduction techniques were used for this  study.  All CT scans performed at this facility use one or all of the following:  Automated

## 2025-01-02 NOTE — CONSULTS
Consult Note            Date:1/2/2025        Patient Name:Silvia Cervantes     YOB: 2000     Age:24 y.o.    Inpatient consult to GI  Consult performed by: Dara Hooper APRN - CNP  Consult ordered by: Remy Son MD          Chief Complaint     Chief Complaint   Patient presents with    Rectal Bleeding        History Obtained From   patient    History of Present Illness   Silvia Cervantes is a 25 yo female who presented to the ED with severe rectal pain and hematochezia. PMH includes Crohn's disease. GI was consulted for a possible perirectal abscess. Patient states on 12/27/24 she started having severe rectal pain. She believed it to be a ruptured hemorrhoid. She stated there was extreme pain to try and go to the bathroom to the point where she felt like she was going to pass out. She states it also hurts to just pass gas. She went to [facility] ED where they gave her hydrocortisone cream and Norco for the pain. She states neither of those things helped with the pain. She tried sitz baths as well with no relief so she decided to come back in. She has been taking laxatives to have a bowel movement otherwise there was no way she could have a bowel movement with the pain she is in. She states when she does have a bowel movement she passes a lot of blood but otherwise there is just some when she wipes. She denies blood thinner or NSAID use. She does drink alcohol occasionally and she vapes. She was seeing Dr. Carnes for her Crohn's. She had an EGD and colonoscopy in October of 2024. She was then started on Rinvoq for her Crohn's which she has been obtaining from a free clinic. She did spike a fever of 100.5 before she came into the ED today. CT AP was performed which showed no perirectal abscess but a possible Bartholin cyst.     Labs: Hgb 9.8 (9.6), Hct 30.9 (30.0), CRP pending    Medications: Rinvoq    Imaging: MRI pelvis pending  CT ABDOMEN PELVIS W IV CONTRAST Additional Contrast?

## 2025-01-02 NOTE — H&P
Hospitalist History and Physical   Admit Date:  2025  6:53 AM   Name:  Silvia Cervantes   Age:  24 y.o.  Sex:  female  :  2000   MRN:  [id number]   Room:  ER14/14    Presenting/Chief Complaint: Rectal Bleeding     Reason(s) for Admission: Perirectal abscess [K61.1]     History of Present Illness:   Silvia Cervantes is a 24 y.o. female with medical history of Crohn's disease who presents today complaining of hematochezia and rectal pain.  Patient's symptoms started on Saturday.  Presented to ED on .  Started using sitz bath's at home as well as stool softeners.  Still had persistent symptoms.  Went to outside ER.  CT abdomen pelvis is concerning for Bartholin's gland cyst.  However case discussed with gastroenterology who had concern for perirectal abscess.  Hospital medicine consulted for admission.      Assessment & Plan:     Perirectal abscess  History of Crohn's disease  Hematochezia  Patient on Asacol? as an outpatient for Crohn's disease maintenance but do not see fill history.  Reportedly gets it through specialty pharmacy for free.  Patient's symptoms and possible overlying abscess is concerning for acute Crohn's disease flare  -GI and general surgery consult  -Check ESR/CRP  -Trend hemoglobin  -Hemodynamically stable  -Depending on management plan for abscess and inflammatory markers, consider starting steroids for acute Crohn's disease flare    PT/OT evals ordered?  Not ordered; patient not expected to need rehab  Diet: ADULT DIET; Regular  VTE prophylaxis: SCD's   Code status: Full Code  Code status discussed: No  Blood consent obtained: No      Non-peripheral Lines and Tubes (if present):             Hospital Problems:  Principal Problem:    Perirectal abscess  Resolved Problems:    * No resolved hospital problems. *        Objective:   Patient Vitals for the past 24 hrs:   Temp Pulse Resp BP SpO2   25 1217 -- -- -- 116/83 100 %   25 1207 -- -- -- -- 100 %

## 2025-01-02 NOTE — ED NOTES
TRANSFER - OUT REPORT:    Verbal report given to RN on Silvia Cervantes  being transferred to [facility] for routine progression of patient care       Report consisted of patient's Situation, Background, Assessment and   Recommendations(SBAR).     Information from the following report(s) Nurse Handoff Report was reviewed with the receiving nurse.    Sumeet Fall Assessment:    Presents to emergency department  because of falls (Syncope, seizure, or loss of consciousness): No  Age > 70: No  Altered Mental Status, Intoxication with alcohol or substance confusion (Disorientation, impaired judgment, poor safety awaremess, or inability to follow instructions): No  Impaired Mobility: Ambulates or transfers with assistive devices or assistance; Unable to ambulate or transer.: No             Lines:   Peripheral IV 01/02/25 Proximal;Right Forearm (Active)   Site Assessment Clean, dry & intact 01/02/25 0634   Phlebitis Assessment No symptoms 01/02/25 0634   Infiltration Assessment 0 01/02/25 0634        Opportunity for questions and clarification was provided.      Patient transported with:  Eleuterio Cortez RN  01/02/25 2874

## 2025-01-02 NOTE — CONSULTS
H&P/Consult Note/Progress Note/Office Note:   Silvia Cervantes  MRN: [id number]  :2000  Age:24 y.o.    HPI: Silvia Cervantes is a 24 y.o. female with a history of Crohn's disease and chronic anemia who we are asked by ED to see for possible perirectal abscess. She presented with rectal pain with BRBPR that began on her birthday 23.  She thought it was a hemorrhoid as 3-4 yrs ago she had similar pain but on the Left anna-rectal region.  She went to the ED 24 and was prescribed anusol and Sitz baths.  The pain has persisted therefor she came into the ED [place] today and is transferred to [facility] for GI as well as Gen Surg evaluation. She was admitted on 2025 for rectal pain with bleeding.     The patient does not feel that this is a Crohn's flare.  Symptoms are different. No abdominal cramping pain.  She is maintained on Revoke for her Crohn's.  She denies constipation or anything that could have triggered the rectal pain or rectal bleeding.  Bleeding in drops in the toilet water, bright red bleeding on her toilet tissue.  She feels constant rectal pressure.  Pain at times is a 10/10.  No associated fever, chills, nausea or vomiting.      Colonoscopy: 10/14/24 (Dr Carnes). Aphous ulcers in the rectosigmoid on colonoscopy.   Abdominal surgery Hx: none    Admission Imaging CT A/P 24: Compared to the recent prior examination and accounting for  differences in technique specifically with administration of oral and rectal  contrast on the contemporary examination with a rectal tube in place,  new acute  findings seen. Specifically no findings to suggest perirectal abscess. The  previously identified nonrim-enhancing fluid collection measuring up to 2.3 cm  of the left perineum is most compatible with a Bartholin's gland cyst. Recommend  correlation with physical exam    Admission Labs:WBC 8.7, Hgb 9.8, Plt 371, LA 1.1. Chemistry unremarkable. CRP and Sed rate

## 2025-01-03 ENCOUNTER — APPOINTMENT (OUTPATIENT)
Dept: MRI IMAGING | Age: 25
DRG: 394 | End: 2025-01-03

## 2025-01-03 LAB
ANION GAP SERPL CALC-SCNC: 8 MMOL/L (ref 7–16)
BUN SERPL-MCNC: 8 MG/DL (ref 6–23)
CALCIUM SERPL-MCNC: 8.7 MG/DL (ref 8.8–10.2)
CHLORIDE SERPL-SCNC: 105 MMOL/L (ref 98–107)
CO2 SERPL-SCNC: 22 MMOL/L (ref 20–29)
CREAT SERPL-MCNC: 0.6 MG/DL (ref 0.6–1.1)
ERYTHROCYTE [DISTWIDTH] IN BLOOD BY AUTOMATED COUNT: 14.7 % (ref 11.9–14.6)
GLUCOSE SERPL-MCNC: 89 MG/DL (ref 70–99)
HCT VFR BLD AUTO: 28.3 % (ref 35.8–46.3)
HGB BLD-MCNC: 8.9 G/DL (ref 11.7–15.4)
MCH RBC QN AUTO: 25.7 PG (ref 26.1–32.9)
MCHC RBC AUTO-ENTMCNC: 31.4 G/DL (ref 31.4–35)
MCV RBC AUTO: 81.8 FL (ref 82–102)
NRBC # BLD: 0 K/UL (ref 0–0.2)
PLATELET # BLD AUTO: 292 K/UL (ref 150–450)
PMV BLD AUTO: 9 FL (ref 9.4–12.3)
POTASSIUM SERPL-SCNC: 4.3 MMOL/L (ref 3.5–5.1)
RBC # BLD AUTO: 3.46 M/UL (ref 4.05–5.2)
SODIUM SERPL-SCNC: 136 MMOL/L (ref 136–145)
WBC # BLD AUTO: 5 K/UL (ref 4.3–11.1)

## 2025-01-03 PROCEDURE — 6370000000 HC RX 637 (ALT 250 FOR IP)

## 2025-01-03 PROCEDURE — 2500000003 HC RX 250 WO HCPCS

## 2025-01-03 PROCEDURE — 36415 COLL VENOUS BLD VENIPUNCTURE: CPT

## 2025-01-03 PROCEDURE — 2580000003 HC RX 258: Performed by: EMERGENCY MEDICINE

## 2025-01-03 PROCEDURE — 80048 BASIC METABOLIC PNL TOTAL CA: CPT

## 2025-01-03 PROCEDURE — 6360000004 HC RX CONTRAST MEDICATION

## 2025-01-03 PROCEDURE — 85027 COMPLETE CBC AUTOMATED: CPT

## 2025-01-03 PROCEDURE — A9579 GAD-BASE MR CONTRAST NOS,1ML: HCPCS

## 2025-01-03 PROCEDURE — 72197 MRI PELVIS W/O & W/DYE: CPT

## 2025-01-03 PROCEDURE — 1100000000 HC RM PRIVATE

## 2025-01-03 PROCEDURE — 99232 SBSQ HOSP IP/OBS MODERATE 35: CPT | Performed by: INTERNAL MEDICINE

## 2025-01-03 RX ORDER — OXYCODONE HYDROCHLORIDE 5 MG/1
5 TABLET ORAL EVERY 4 HOURS PRN
Status: DISCONTINUED | OUTPATIENT
Start: 2025-01-03 | End: 2025-01-04

## 2025-01-03 RX ORDER — LORAZEPAM 1 MG/1
1 TABLET ORAL EVERY 4 HOURS PRN
Status: DISCONTINUED | OUTPATIENT
Start: 2025-01-03 | End: 2025-01-04

## 2025-01-03 RX ADMIN — HYDROMORPHONE HYDROCHLORIDE 1 MG: 1 INJECTION, SOLUTION INTRAMUSCULAR; INTRAVENOUS; SUBCUTANEOUS at 09:53

## 2025-01-03 RX ADMIN — HYDROMORPHONE HYDROCHLORIDE 1 MG: 1 INJECTION, SOLUTION INTRAMUSCULAR; INTRAVENOUS; SUBCUTANEOUS at 20:46

## 2025-01-03 RX ADMIN — ONDANSETRON 4 MG: 4 TABLET, ORALLY DISINTEGRATING ORAL at 21:51

## 2025-01-03 RX ADMIN — SODIUM CHLORIDE, PRESERVATIVE FREE 10 ML: 5 INJECTION INTRAVENOUS at 09:53

## 2025-01-03 RX ADMIN — SODIUM CHLORIDE, PRESERVATIVE FREE 10 ML: 5 INJECTION INTRAVENOUS at 15:28

## 2025-01-03 RX ADMIN — POLYETHYLENE GLYCOL 3350 17 G: 17 POWDER, FOR SOLUTION ORAL at 20:52

## 2025-01-03 RX ADMIN — HYDROMORPHONE HYDROCHLORIDE 1 MG: 1 INJECTION, SOLUTION INTRAMUSCULAR; INTRAVENOUS; SUBCUTANEOUS at 16:21

## 2025-01-03 RX ADMIN — GADOTERIDOL 16 ML: 279.3 INJECTION, SOLUTION INTRAVENOUS at 15:28

## 2025-01-03 RX ADMIN — OXYCODONE 5 MG: 5 TABLET ORAL at 14:21

## 2025-01-03 RX ADMIN — SODIUM CHLORIDE, POTASSIUM CHLORIDE, SODIUM LACTATE AND CALCIUM CHLORIDE: 600; 310; 30; 20 INJECTION, SOLUTION INTRAVENOUS at 06:39

## 2025-01-03 RX ADMIN — SODIUM CHLORIDE, PRESERVATIVE FREE 10 ML: 5 INJECTION INTRAVENOUS at 20:43

## 2025-01-03 ASSESSMENT — PAIN SCALES - GENERAL
PAINLEVEL_OUTOF10: 2
PAINLEVEL_OUTOF10: 7
PAINLEVEL_OUTOF10: 9
PAINLEVEL_OUTOF10: 7
PAINLEVEL_OUTOF10: 7
PAINLEVEL_OUTOF10: 6
PAINLEVEL_OUTOF10: 8

## 2025-01-03 ASSESSMENT — PAIN DESCRIPTION - DESCRIPTORS
DESCRIPTORS: ACHING
DESCRIPTORS: ACHING
DESCRIPTORS: BURNING;STABBING
DESCRIPTORS: ACHING

## 2025-01-03 ASSESSMENT — PAIN DESCRIPTION - LOCATION
LOCATION: BUTTOCKS
LOCATION: BUTTOCKS;RECTUM

## 2025-01-03 ASSESSMENT — PAIN - FUNCTIONAL ASSESSMENT: PAIN_FUNCTIONAL_ASSESSMENT: ACTIVITIES ARE NOT PREVENTED

## 2025-01-03 ASSESSMENT — PAIN DESCRIPTION - ONSET: ONSET: PROGRESSIVE

## 2025-01-03 ASSESSMENT — PAIN DESCRIPTION - ORIENTATION: ORIENTATION: POSTERIOR

## 2025-01-03 ASSESSMENT — PAIN DESCRIPTION - FREQUENCY: FREQUENCY: INTERMITTENT

## 2025-01-03 ASSESSMENT — PAIN DESCRIPTION - PAIN TYPE: TYPE: ACUTE PAIN

## 2025-01-03 NOTE — PLAN OF CARE
Problem: Discharge Planning  Goal: Discharge to home or other facility with appropriate resources  1/3/2025 1038 by Luis Winter RN  Outcome: Progressing  1/3/2025 0046 by Tiki Kearney RN  Outcome: Progressing  Flowsheets (Taken 1/2/2025 1533 by Ky Segura, RN)  Discharge to home or other facility with appropriate resources: Identify barriers to discharge with patient and caregiver     Problem: Pain  Goal: Verbalizes/displays adequate comfort level or baseline comfort level  1/3/2025 1038 by Luis Winter RN  Outcome: Progressing  1/3/2025 0046 by Tiki Kearney, RN  Outcome: Progressing

## 2025-01-03 NOTE — PROGRESS NOTES
H&P/Consult Note/Progress Note/Office Note:   Silvia Cervantes  MRN: [id number]  :2000  Age:24 y.o.    HPI: Silvia Cervantes is a 24 y.o. female with a history of Crohn's disease and chronic anemia who we are asked by ED to see for possible perirectal abscess. She presented with rectal pain with BRBPR that began on her birthday 23.  She thought it was a hemorrhoid as 3-4 yrs ago she had similar pain but on the Left anna-rectal region.  She went to the ED 24 and was prescribed anusol and Sitz baths.  The pain has persisted therefor she came into the ED [place] today and is transferred to [facility] for GI as well as Gen Surg evaluation. She was admitted on 2025 for rectal pain with bleeding.     The patient does not feel that this is a Crohn's flare.  Symptoms are different. No abdominal cramping pain.  She is maintained on Revoke for her Crohn's.  She denies constipation or anything that could have triggered the rectal pain or rectal bleeding.  Bleeding in drops in the toilet water, bright red bleeding on her toilet tissue.  She feels constant rectal pressure.  Pain at times is a 10/10.  No associated fever, chills, nausea or vomiting.      Colonoscopy: 10/14/24 (Dr Carnes). Aphous ulcers in the rectosigmoid on colonoscopy.   Abdominal surgery Hx: none    Admission Imaging CT A/P 24: Compared to the recent prior examination and accounting for  differences in technique specifically with administration of oral and rectal  contrast on the contemporary examination with a rectal tube in place,  new acute  findings seen. Specifically no findings to suggest perirectal abscess. The  previously identified nonrim-enhancing fluid collection measuring up to 2.3 cm  of the left perineum is most compatible with a Bartholin's gland cyst. Recommend  correlation with physical exam    Admission Labs:WBC 8.7, Hgb 9.8, Plt 371, LA 1.1. Chemistry unremarkable. CRP and Sed rate  ovary is normal in echogenicity and size measuring  3.9 x 2.8 x 2.8 cm. Complex cyst likely corpus luteal cyst measuring 2.3 x 2.0 x  1.6 cm.    Left Ovary/Adnexa: The left ovary is normal in echogenicity and size measuring  3.7 x 1.9 x 2.5 cm. Complex left ovarian cyst, likely hemorrhagic measuring 2.1  x 0.8 x 1.6 cm.    Free Fluid: Moderate volume of free fluid in the pelvis with some debris.    Impression  1. Endometrial complex is normal in thickness measuring 3 mm. No uterine  fibroid.  2. Moderate volume of free fluid in the pelvis with debris.  3. Complex right ovarian cyst measuring 2.3 cm, likely corpus luteal cyst.  Complex left ovarian cyst measuring 2.1 cm, likely hemorrhagic cyst.        Admission date (for inpatients): 1/2/2025   * No surgery found *  * No surgery found *    ASSESSMENT/PLAN:  Rectal pain with BRBPR since 12/27/24 without improvement of symptoms with conservative management with anusol and sitz baths.     Principal Problem:    Perirectal abscess  Active Problems:    Crohn's disease (HCC)    PCOS (polycystic ovarian syndrome)    Chronic anemia  Resolved Problems:    * No resolved hospital problems. *     Monitor and treat for pain  MRI pelvis pending- r/o recto-sigmoid evidence of ulcerative disease    SED rate and CRP both wnl  GI consult noted- following  Following      Signed:  MEGA FLORES - CNP

## 2025-01-03 NOTE — PROGRESS NOTES
Silvia Cervantes is 24 y.o. y/o female     Patient with hx of IBD (Crohns per patient but recent work up suggestive of UC on Rinvoq) presenting with rectal pain/hematochezia with concerning of possible anna-rectal abscess, though apparently not evident on CT (stated Bartholin gland cyst). Awaiting MRI Pelvis for further evaluation. Surgery following.    Today: Pending MRI; MRI department said should be done today or tomorrow. Patient continues to have significant rectal pain; no BM or rectal bleeding since admission. Some abdominal cramping (about to start menstrual cycle), but no vomiting or fevers.  She is concerned about continuing rectal pain if MRI pelvis is unremarkable.    Labs: Hgb 8.9 (9.8). CRP WNL, Sed Rate 8, WBC WNL      PE:   Vitals:    01/03/25 0753   BP: 106/64   Pulse: 75   Resp: 16   Temp: 97.9 °F (36.6 °C)   SpO2: 98%      General:  The patient appears well-nourished, and is in no acute distress.    HEENT:  Normocephalic, atraumatic. No sclerae icterus.   Neurologic:  Alert and oriented x3.  Psychiatric: Appropriate mood and affect.    Assessment and Plan:   #Rectal pain: Pending MRI pelvis for next steps. On pain meds currently, still with significant rectal pain. CRP WNL. Hgb slight decline, no overt bleeding today. MD to follow.      Electronically signed by Kayla Stinson PA-C.

## 2025-01-03 NOTE — PROGRESS NOTES
problems. *      Objective:   Patient Vitals for the past 24 hrs:   Temp Pulse Resp BP SpO2   01/03/25 0753 97.9 °F (36.6 °C) 75 16 106/64 98 %   01/02/25 1958 98.6 °F (37 °C) 78 16 105/62 98 %   01/02/25 1533 98.6 °F (37 °C) 91 17 126/79 99 %   01/02/25 1500 -- -- -- 110/69 100 %   01/02/25 1331 -- -- -- 116/76 99 %   01/02/25 1316 -- -- -- 110/70 100 %   01/02/25 1301 -- -- -- 107/66 98 %   01/02/25 1246 -- -- -- 116/77 99 %   01/02/25 1231 -- -- -- 111/73 100 %   01/02/25 1217 -- -- -- 116/83 100 %   01/02/25 1207 -- -- -- -- 100 %       Oxygen Therapy  SpO2: 98 %  Pulse via Oximetry: 79 beats per minute  O2 Device: None (Room air)    Estimated body mass index is 29.62 kg/m² as calculated from the following:    Height as of this encounter: 1.651 m (5' 5\").    Weight as of this encounter: 80.7 kg (178 lb).  No intake or output data in the 24 hours ending 01/03/25 1205      Physical Exam:     General:    Well nourished.    Head:  Normocephalic, atraumatic  Eyes:  Sclerae appear normal.  Pupils equally round.  ENT:  Nares appear normal.  Moist oral mucosa  Neck:  No restricted ROM.  Trachea midline   CV:   RRR.  No m/r/g.  No jugular venous distension.  Lungs:   CTAB.  No wheezing, rhonchi, or rales.  Symmetric expansion.  Abdomen:   Soft, nontender, nondistended.  Extremities: No cyanosis or clubbing.  No edema  Skin:     No rashes.  Normal coloration.   Warm and dry.    Neuro:  CN II-XII grossly intact.    Psych:  Normal mood and affect.      I have personally reviewed labs and tests:  Recent Labs:  Recent Results (from the past 48 hour(s))   TYPE AND SCREEN    Collection Time: 01/02/25  6:55 AM   Result Value Ref Range    Crossmatch expiration date 01/05/2025,8516     ABO/Rh A POSITIVE     Antibody Screen NEG    CBC    Collection Time: 01/02/25  6:57 AM   Result Value Ref Range    WBC 8.7 4.3 - 11.1 K/uL    RBC 3.74 (L) 4.05 - 5.2 M/uL    Hemoglobin 9.8 (L) 11.7 - 15.4 g/dL    Hematocrit 30.9 (L) 35.8 - 46.3 %     (GASTROGRAFIN) 66-10 % solution 60 mL  60 mL Rectal ONCE PRN    lactated ringers infusion   IntraVENous Continuous    sodium chloride flush 0.9 % injection 5-40 mL  5-40 mL IntraVENous 2 times per day    sodium chloride flush 0.9 % injection 5-40 mL  5-40 mL IntraVENous PRN    0.9 % sodium chloride infusion   IntraVENous PRN    potassium chloride (KLOR-CON M) extended release tablet 40 mEq  40 mEq Oral PRN    Or    potassium bicarb-citric acid (EFFER-K) effervescent tablet 40 mEq  40 mEq Oral PRN    Or    potassium chloride 10 mEq/100 mL IVPB (Peripheral Line)  10 mEq IntraVENous PRN    magnesium sulfate 2000 mg in 50 mL IVPB premix  2,000 mg IntraVENous PRN    ondansetron (ZOFRAN-ODT) disintegrating tablet 4 mg  4 mg Oral Q8H PRN    Or    ondansetron (ZOFRAN) injection 4 mg  4 mg IntraVENous Q6H PRN    polyethylene glycol (GLYCOLAX) packet 17 g  17 g Oral Daily PRN    acetaminophen (TYLENOL) tablet 650 mg  650 mg Oral Q6H PRN    Or    acetaminophen (TYLENOL) suppository 650 mg  650 mg Rectal Q6H PRN    HYDROmorphone HCl PF (DILAUDID) injection 1 mg  1 mg IntraVENous Q4H PRN       Signed:  Remy Son MD    Part of this note may have been written by using a voice dictation software.  The note has been proof read but may still contain some grammatical/other typographical errors.

## 2025-01-04 VITALS
HEIGHT: 65 IN | OXYGEN SATURATION: 99 % | RESPIRATION RATE: 18 BRPM | TEMPERATURE: 97.9 F | BODY MASS INDEX: 29.66 KG/M2 | WEIGHT: 178 LBS | SYSTOLIC BLOOD PRESSURE: 116 MMHG | DIASTOLIC BLOOD PRESSURE: 73 MMHG | HEART RATE: 70 BPM

## 2025-01-04 LAB
ANION GAP SERPL CALC-SCNC: 11 MMOL/L (ref 7–16)
BUN SERPL-MCNC: 7 MG/DL (ref 6–23)
CALCIUM SERPL-MCNC: 8.5 MG/DL (ref 8.8–10.2)
CHLORIDE SERPL-SCNC: 104 MMOL/L (ref 98–107)
CO2 SERPL-SCNC: 21 MMOL/L (ref 20–29)
CREAT SERPL-MCNC: 0.59 MG/DL (ref 0.6–1.1)
ERYTHROCYTE [DISTWIDTH] IN BLOOD BY AUTOMATED COUNT: 14.9 % (ref 11.9–14.6)
GLUCOSE SERPL-MCNC: 92 MG/DL (ref 70–99)
HCT VFR BLD AUTO: 28.4 % (ref 35.8–46.3)
HGB BLD-MCNC: 9 G/DL (ref 11.7–15.4)
MCH RBC QN AUTO: 25.9 PG (ref 26.1–32.9)
MCHC RBC AUTO-ENTMCNC: 31.7 G/DL (ref 31.4–35)
MCV RBC AUTO: 81.6 FL (ref 82–102)
NRBC # BLD: 0 K/UL (ref 0–0.2)
PLATELET # BLD AUTO: 339 K/UL (ref 150–450)
PMV BLD AUTO: 9.9 FL (ref 9.4–12.3)
POTASSIUM SERPL-SCNC: 3.7 MMOL/L (ref 3.5–5.1)
RBC # BLD AUTO: 3.48 M/UL (ref 4.05–5.2)
SODIUM SERPL-SCNC: 136 MMOL/L (ref 136–145)
WBC # BLD AUTO: 8 K/UL (ref 4.3–11.1)

## 2025-01-04 PROCEDURE — 80048 BASIC METABOLIC PNL TOTAL CA: CPT

## 2025-01-04 PROCEDURE — 36415 COLL VENOUS BLD VENIPUNCTURE: CPT

## 2025-01-04 PROCEDURE — 99233 SBSQ HOSP IP/OBS HIGH 50: CPT | Performed by: INTERNAL MEDICINE

## 2025-01-04 PROCEDURE — 6370000000 HC RX 637 (ALT 250 FOR IP)

## 2025-01-04 PROCEDURE — 85027 COMPLETE CBC AUTOMATED: CPT

## 2025-01-04 PROCEDURE — 2500000003 HC RX 250 WO HCPCS

## 2025-01-04 RX ORDER — OXYCODONE HYDROCHLORIDE 5 MG/1
5 TABLET ORAL EVERY 4 HOURS PRN
Status: DISCONTINUED | OUTPATIENT
Start: 2025-01-04 | End: 2025-01-04 | Stop reason: HOSPADM

## 2025-01-04 RX ORDER — IBUPROFEN 800 MG/1
800 TABLET, FILM COATED ORAL EVERY 6 HOURS PRN
Status: DISCONTINUED | OUTPATIENT
Start: 2025-01-04 | End: 2025-01-04 | Stop reason: HOSPADM

## 2025-01-04 RX ORDER — ACETAMINOPHEN 650 MG/1
650 SUPPOSITORY RECTAL EVERY 8 HOURS SCHEDULED
Status: DISCONTINUED | OUTPATIENT
Start: 2025-01-04 | End: 2025-01-04 | Stop reason: HOSPADM

## 2025-01-04 RX ORDER — DOCUSATE SODIUM 100 MG/1
100 CAPSULE, LIQUID FILLED ORAL 2 TIMES DAILY
Qty: 60 CAPSULE | Refills: 0 | Status: SHIPPED | OUTPATIENT
Start: 2025-01-04 | End: 2025-02-03

## 2025-01-04 RX ORDER — NITROGLYCERIN 4 MG/G
1 OINTMENT RECTAL EVERY 12 HOURS
Qty: 30 G | Refills: 2 | Status: SHIPPED | OUTPATIENT
Start: 2025-01-04

## 2025-01-04 RX ORDER — ACETAMINOPHEN 500 MG
1000 TABLET ORAL EVERY 8 HOURS SCHEDULED
Status: DISCONTINUED | OUTPATIENT
Start: 2025-01-04 | End: 2025-01-04 | Stop reason: HOSPADM

## 2025-01-04 RX ADMIN — HYDROMORPHONE HYDROCHLORIDE 1 MG: 1 INJECTION, SOLUTION INTRAMUSCULAR; INTRAVENOUS; SUBCUTANEOUS at 06:19

## 2025-01-04 RX ADMIN — SODIUM CHLORIDE, PRESERVATIVE FREE 10 ML: 5 INJECTION INTRAVENOUS at 11:25

## 2025-01-04 RX ADMIN — HYDROMORPHONE HYDROCHLORIDE 1 MG: 1 INJECTION, SOLUTION INTRAMUSCULAR; INTRAVENOUS; SUBCUTANEOUS at 00:51

## 2025-01-04 RX ADMIN — OXYCODONE 5 MG: 5 TABLET ORAL at 11:24

## 2025-01-04 ASSESSMENT — PAIN SCALES - GENERAL
PAINLEVEL_OUTOF10: 8
PAINLEVEL_OUTOF10: 9
PAINLEVEL_OUTOF10: 8
PAINLEVEL_OUTOF10: 2

## 2025-01-04 ASSESSMENT — PAIN DESCRIPTION - DESCRIPTORS
DESCRIPTORS: ACHING
DESCRIPTORS: BURNING;STABBING
DESCRIPTORS: BURNING;ACHING;STABBING

## 2025-01-04 ASSESSMENT — ENCOUNTER SYMPTOMS
ABDOMINAL PAIN: 0
DIARRHEA: 0
EYES NEGATIVE: 1
SHORTNESS OF BREATH: 0
TROUBLE SWALLOWING: 0
ABDOMINAL DISTENTION: 0
VOMITING: 0
COLOR CHANGE: 0
ANAL BLEEDING: 1
BLOOD IN STOOL: 0
RECTAL PAIN: 1
CONSTIPATION: 0
NAUSEA: 0

## 2025-01-04 ASSESSMENT — PAIN DESCRIPTION - ONSET
ONSET: PROGRESSIVE
ONSET: PROGRESSIVE

## 2025-01-04 ASSESSMENT — PAIN DESCRIPTION - PAIN TYPE
TYPE: ACUTE PAIN
TYPE: ACUTE PAIN

## 2025-01-04 ASSESSMENT — PAIN DESCRIPTION - FREQUENCY
FREQUENCY: INTERMITTENT
FREQUENCY: INTERMITTENT

## 2025-01-04 ASSESSMENT — PAIN DESCRIPTION - ORIENTATION
ORIENTATION: POSTERIOR
ORIENTATION: POSTERIOR

## 2025-01-04 ASSESSMENT — PAIN - FUNCTIONAL ASSESSMENT
PAIN_FUNCTIONAL_ASSESSMENT: PREVENTS OR INTERFERES SOME ACTIVE ACTIVITIES AND ADLS
PAIN_FUNCTIONAL_ASSESSMENT: PREVENTS OR INTERFERES SOME ACTIVE ACTIVITIES AND ADLS

## 2025-01-04 ASSESSMENT — PAIN DESCRIPTION - LOCATION
LOCATION: RECTUM;BUTTOCKS
LOCATION: RECTUM
LOCATION: BUTTOCKS

## 2025-01-04 NOTE — PLAN OF CARE
Problem: Discharge Planning  Goal: Discharge to home or other facility with appropriate resources  1/4/2025 1136 by Luis Winter, RN  Outcome: Progressing  1/3/2025 2312 by Any Godinez, RN  Outcome: Progressing     Problem: Pain  Goal: Verbalizes/displays adequate comfort level or baseline comfort level  1/4/2025 1136 by Luis Winter, RN  Outcome: Progressing  1/3/2025 2312 by Any Godinez, RN  Outcome: Progressing

## 2025-01-04 NOTE — DISCHARGE SUMMARY
Patient discharged with fiance and all belongings in hand. AVS reviewed and patient had no further questions or concerns.

## 2025-01-04 NOTE — DISCHARGE SUMMARY
Hospitalist Discharge Summary   Admit Date:  2025  6:53 AM   DC Note date: 2025  Name:  Silvia Cervantes   Age:  24 y.o.  Sex:  female  :  2000   MRN:  [id number]   Room:  [place] [address]  PCP:  No primary care provider on file.    Presenting Complaint: Rectal Bleeding     Initial Admission Diagnosis: Hemorrhage of rectum and anus [K62.5]  Perirectal abscess [K61.1]  History of Crohn's disease [Z87.19]     Problem List for this Hospitalization (present on admission):    Principal Problem:    Perirectal abscess  Active Problems:    Crohn's disease (HCC)    PCOS (polycystic ovarian syndrome)    Chronic anemia  Resolved Problems:    * No resolved hospital problems. *      Hospital Course:  Silvia Cervantes is a 24 y.o. female with medical history of Crohn's disease who presents today complaining of hematochezia and rectal pain. Patient's symptoms started on Saturday. Presented to ED on . Started using sitz bath's at home as well as stool softeners. Still had persistent symptoms. Went to outside ER. CT abdomen pelvis is concerning for Bartholin's gland cyst. However case discussed with gastroenterology who had concern for perirectal abscess. Hospital medicine consulted for admission.  MRI performed which again showed Bartholin's gland cyst.  Rectal exam performed by gastroenterology showed anal fissure which is likely the cause of patient's rectal pain and hematochezia.  Negative inflammatory markers while patient was admitted.  Given nitroglycerin 0.4% rectal cream.  Given stool softeners as well.  Discharged on  with plans to follow-up with gastroenterology and general surgery.  Patient verbalized understanding of the plan.  Provided patient with work note as well.    Disposition: Home  Diet: ADULT DIET; Regular  Code Status: Full Code    Follow Ups:  Follow-up Information    None               Time spent in patient discharge and coordination 30 minutes.      Follow up labs/diagnostics  Osito Son MD    Part of this note may have been written by using a voice dictation software.  The note has been proof read but may still contain some grammatical/other typographical errors.

## 2025-01-04 NOTE — PROGRESS NOTES
Assessment/Plan  MRI was reviewed and is negative for perirectal abscess.  Chaperoned rectal exam was performed with patient's nurse.  With the patient in the left lateral decubitus position the gluteal cleft was spread and at the 9 o'clock position she had evidence of a anal fissure with a small amount of blood.  Would recommend nitroglycerin ointment 0.125%, apply pea-sized amount to the fissure 4 times a day for 3 months.  Surgery is on board so surgical approach would be better suited for this patient defer to them.  GI will sign off.  Patient will need to follow-up with Dr. Carnes in 4 to 6 weeks.      MEDs:     Current Facility-Administered Medications   Medication Dose Route Frequency Provider Last Rate Last Admin    acetaminophen (TYLENOL) tablet 1,000 mg  1,000 mg Oral 3 times per day Remy Son MD        Or    acetaminophen (TYLENOL) suppository 650 mg  650 mg Rectal 3 times per day Remy Son MD        oxyCODONE (ROXICODONE) immediate release tablet 5 mg  5 mg Oral Q4H PRN Remy Son MD        ibuprofen (ADVIL;MOTRIN) tablet 800 mg  800 mg Oral Q6H PRN Remy Son MD        diatrizoate meglumine-sodium (GASTROGRAFIN) 66-10 % solution 15 mL  15 mL Oral ONCE PRN Sergio Thompson MD   15 mL at 01/02/25 0848    diatrizoate meglumine-sodium (GASTROGRAFIN) 66-10 % solution 60 mL  60 mL Rectal ONCE PRN Sergio Thompson MD   30 mL at 01/02/25 0951    sodium chloride flush 0.9 % injection 5-40 mL  5-40 mL IntraVENous 2 times per day Remy Son MD   10 mL at 01/03/25 2043    sodium chloride flush 0.9 % injection 5-40 mL  5-40 mL IntraVENous PRN Remy Son MD   10 mL at 01/03/25 1528    0.9 % sodium chloride infusion   IntraVENous PRN Remy Son MD        potassium chloride (KLOR-CON M) extended release tablet 40 mEq  40 mEq Oral PRN Remy Son MD        Or    potassium bicarb-citric acid (EFFER-K)

## 2025-01-04 NOTE — DISCHARGE INSTRUCTIONS
condition or this instruction, always ask your healthcare professional. [employer] disclaims any warranty or liability for your use of this information.

## 2025-01-06 LAB
C TRACH RRNA SPEC QL NAA+PROBE: NORMAL
N GONORRHOEA RRNA SPEC QL NAA+PROBE: NORMAL
SPECIMEN SOURCE: NORMAL

## 2025-01-17 DIAGNOSIS — K60.2 ANAL FISSURE: Primary | ICD-10-CM

## 2025-01-17 RX ORDER — LIDOCAINE 5 G/100G
1 CREAM RECTAL; TOPICAL DAILY PRN
Qty: 28 G | Refills: 0 | Status: SHIPPED | OUTPATIENT
Start: 2025-01-17

## 2025-02-03 ENCOUNTER — TELEPHONE (OUTPATIENT)
Dept: GASTROENTEROLOGY | Age: 25
End: 2025-02-03

## 2025-02-04 ENCOUNTER — TELEPHONE (OUTPATIENT)
Dept: GASTROENTEROLOGY | Age: 25
End: 2025-02-04

## 2025-02-04 NOTE — TELEPHONE ENCOUNTER
Successfully faxed prescription for Rinvoq refill to [facility] patient assistance at [phone].

## 2025-05-18 ENCOUNTER — APPOINTMENT (OUTPATIENT)
Dept: GENERAL RADIOLOGY | Age: 25
End: 2025-05-18

## 2025-05-18 ENCOUNTER — HOSPITAL ENCOUNTER (EMERGENCY)
Age: 25
Discharge: HOME | End: 2025-05-18
Attending: EMERGENCY MEDICINE

## 2025-05-18 VITALS
BODY MASS INDEX: 28.32 KG/M2 | DIASTOLIC BLOOD PRESSURE: 74 MMHG | TEMPERATURE: 97.9 F | HEIGHT: 65 IN | RESPIRATION RATE: 18 BRPM | HEART RATE: 77 BPM | OXYGEN SATURATION: 100 % | WEIGHT: 170 LBS | SYSTOLIC BLOOD PRESSURE: 128 MMHG

## 2025-05-18 DIAGNOSIS — S99.911A INJURY OF RIGHT ANKLE, INITIAL ENCOUNTER: Primary | ICD-10-CM

## 2025-05-18 DIAGNOSIS — S93.401A SPRAIN OF RIGHT ANKLE, UNSPECIFIED LIGAMENT, INITIAL ENCOUNTER: ICD-10-CM

## 2025-05-18 PROCEDURE — 6370000000 HC RX 637 (ALT 250 FOR IP): Performed by: EMERGENCY MEDICINE

## 2025-05-18 PROCEDURE — 73610 X-RAY EXAM OF ANKLE: CPT

## 2025-05-18 PROCEDURE — 99283 EMERGENCY DEPT VISIT LOW MDM: CPT

## 2025-05-18 RX ORDER — HYDROCODONE BITARTRATE AND ACETAMINOPHEN 7.5; 325 MG/1; MG/1
1 TABLET ORAL EVERY 6 HOURS PRN
Qty: 8 TABLET | Refills: 0 | Status: SHIPPED | OUTPATIENT
Start: 2025-05-18 | End: 2025-05-21

## 2025-05-18 RX ORDER — HYDROCODONE BITARTRATE AND ACETAMINOPHEN 7.5; 325 MG/1; MG/1
1 TABLET ORAL
Refills: 0 | Status: COMPLETED | OUTPATIENT
Start: 2025-05-18 | End: 2025-05-18

## 2025-05-18 RX ADMIN — HYDROCODONE BITARTRATE AND ACETAMINOPHEN 1 TABLET: 7.5; 325 TABLET ORAL at 15:16

## 2025-05-18 ASSESSMENT — LIFESTYLE VARIABLES
HOW OFTEN DO YOU HAVE A DRINK CONTAINING ALCOHOL: 2-4 TIMES A MONTH
HOW MANY STANDARD DRINKS CONTAINING ALCOHOL DO YOU HAVE ON A TYPICAL DAY: 1 OR 2

## 2025-05-18 ASSESSMENT — PAIN DESCRIPTION - ORIENTATION
ORIENTATION: RIGHT

## 2025-05-18 ASSESSMENT — PAIN DESCRIPTION - LOCATION
LOCATION: FOOT
LOCATION: FOOT
LOCATION: ANKLE

## 2025-05-18 ASSESSMENT — PAIN SCALES - GENERAL
PAINLEVEL_OUTOF10: 5
PAINLEVEL_OUTOF10: 10
PAINLEVEL_OUTOF10: 10

## 2025-05-18 ASSESSMENT — PAIN DESCRIPTION - DESCRIPTORS: DESCRIPTORS: THROBBING

## 2025-05-18 ASSESSMENT — PAIN - FUNCTIONAL ASSESSMENT: PAIN_FUNCTIONAL_ASSESSMENT: 0-10

## 2025-05-18 ASSESSMENT — PAIN DESCRIPTION - PAIN TYPE: TYPE: ACUTE PAIN

## 2025-05-18 NOTE — DISCHARGE INSTRUCTIONS
Tylenol for discomfort  Elevation and cool pack when at home to help with swelling and discomfort  Limited prescription of stronger pain medication for unrelieved pain  We are referring you to [facility]  Wear walking boot for stability and comfort

## 2025-05-18 NOTE — ED TRIAGE NOTES
Patient arrived with a complaint of injury to right ankle. Patient had previously sprained it few weeks ago.

## 2025-05-27 NOTE — ED PROVIDER NOTES
Emergency Department Provider Note       PCP: No primary care provider on file.   Age: 24 y.o.   Sex: female     DISPOSITION Decision To Discharge 05/18/2025 03:25:47 PM   DISPOSITION CONDITION Stable            ICD-10-CM    1. Injury of right ankle, initial encounter  S99.911A HYDROcodone-acetaminophen (NORCO) 7.5-325 MG per tablet      2. Sprain of right ankle, unspecified ligament, initial encounter  S93.401A HYDROcodone-acetaminophen (NORCO) 7.5-325 MG per tablet          Medical Decision Making     Inversion type injury to the right ankle with soreness to the lateral ankle with minimal swelling at present.  Overall stable but somewhat limited due to discomfort.  No Achilles defect have referred to orthopedics.  Placed her in a walking boot.  Given prescription for pain medication.  Encouraged her to elevate and rest and wear boot as mentioned.  Cool compresses as well.     1 acute, uncomplicated illness or injury.  Prescription drug management performed.    I independently ordered and reviewed each unique test.       I interpreted the X-rays right ankle x-ray shows no abnormality to the bones of the ankle or proximal/mid foot.              History     Patient complains of injury to right ankle.  Evidently it also had a sprain type injury to same ankle several weeks ago.  This occurred while she was walking at [employer] [place] able to ambulate on this but with discomfort.  Has tried no meds.  No other injury.    The history is provided by the patient.       ROS     Review of Systems   Musculoskeletal:  Positive for arthralgias.   Skin: Negative.    Neurological:  Negative for weakness and numbness.        Physical Exam     Vitals signs and nursing note reviewed:  Vitals:    05/18/25 1336 05/18/25 1337 05/18/25 1530   BP:  123/84 128/74   Pulse:  99 77   Resp:  17 18   Temp:  98.6 °F (37 °C) 97.9 °F (36.6 °C)   SpO2:  99% 100%   Weight: 77.1 kg (170 lb)     Height: 1.651 m (5' 5\")        Physical Exam  Vitals and

## 2025-06-07 ENCOUNTER — HOSPITAL ENCOUNTER (EMERGENCY)
Age: 25
Discharge: HOME | End: 2025-06-07

## 2025-06-07 VITALS
RESPIRATION RATE: 14 BRPM | TEMPERATURE: 97.8 F | SYSTOLIC BLOOD PRESSURE: 113 MMHG | OXYGEN SATURATION: 100 % | BODY MASS INDEX: 30.82 KG/M2 | WEIGHT: 185 LBS | HEIGHT: 65 IN | DIASTOLIC BLOOD PRESSURE: 72 MMHG | HEART RATE: 70 BPM

## 2025-06-07 DIAGNOSIS — R07.9 CHEST PAIN, UNSPECIFIED TYPE: Primary | ICD-10-CM

## 2025-06-07 DIAGNOSIS — R10.13 ABDOMINAL PAIN, EPIGASTRIC: ICD-10-CM

## 2025-06-07 DIAGNOSIS — D64.9 ANEMIA, UNSPECIFIED TYPE: ICD-10-CM

## 2025-06-07 LAB
ALBUMIN SERPL-MCNC: 3.9 G/DL (ref 3.5–5)
ALBUMIN/GLOB SERPL: 1.3 (ref 1–1.9)
ALP SERPL-CCNC: 44 U/L (ref 35–104)
ALT SERPL-CCNC: 29 U/L (ref 8–45)
ANION GAP SERPL CALC-SCNC: 15 MMOL/L (ref 7–16)
AST SERPL-CCNC: 22 U/L (ref 15–37)
BASOPHILS # BLD: 0.08 K/UL (ref 0–0.2)
BASOPHILS NFR BLD: 0.7 % (ref 0–2)
BILIRUB SERPL-MCNC: 0.3 MG/DL (ref 0–1.2)
BUN SERPL-MCNC: 10 MG/DL (ref 6–23)
CALCIUM SERPL-MCNC: 8.9 MG/DL (ref 8.8–10.2)
CHLORIDE SERPL-SCNC: 101 MMOL/L (ref 98–107)
CO2 SERPL-SCNC: 23 MMOL/L (ref 20–29)
CREAT SERPL-MCNC: 0.71 MG/DL (ref 0.6–1.1)
D DIMER PPP FEU-MCNC: 0.31 UG/ML(FEU)
DIFFERENTIAL METHOD BLD: ABNORMAL
EKG ATRIAL RATE: 60 BPM
EKG DIAGNOSIS: NORMAL
EKG P AXIS: 11 DEGREES
EKG P-R INTERVAL: 149 MS
EKG Q-T INTERVAL: 442 MS
EKG QRS DURATION: 84 MS
EKG QTC CALCULATION (BAZETT): 446 MS
EKG R AXIS: 70 DEGREES
EKG T AXIS: 55 DEGREES
EKG VENTRICULAR RATE: 61 BPM
EOSINOPHIL # BLD: 0.18 K/UL (ref 0–0.8)
EOSINOPHIL NFR BLD: 1.6 % (ref 0.5–7.8)
ERYTHROCYTE [DISTWIDTH] IN BLOOD BY AUTOMATED COUNT: 17.1 % (ref 11.9–14.6)
GLOBULIN SER CALC-MCNC: 2.9 G/DL (ref 2.3–3.5)
GLUCOSE SERPL-MCNC: 87 MG/DL (ref 70–99)
HCT VFR BLD AUTO: 26.1 % (ref 35.8–46.3)
HGB BLD-MCNC: 7.8 G/DL (ref 11.7–15.4)
IMM GRANULOCYTES # BLD AUTO: 0.12 K/UL (ref 0–0.5)
IMM GRANULOCYTES NFR BLD AUTO: 1.1 % (ref 0–5)
LIPASE SERPL-CCNC: 25 U/L (ref 13–60)
LYMPHOCYTES # BLD: 6.24 K/UL (ref 0.5–4.6)
LYMPHOCYTES NFR BLD: 55.7 % (ref 13–44)
MCH RBC QN AUTO: 22.3 PG (ref 26.1–32.9)
MCHC RBC AUTO-ENTMCNC: 29.9 G/DL (ref 31.4–35)
MCV RBC AUTO: 74.6 FL (ref 82–102)
MONOCYTES # BLD: 0.85 K/UL (ref 0.1–1.3)
MONOCYTES NFR BLD: 7.6 % (ref 4–12)
NEUTS SEG # BLD: 3.73 K/UL (ref 1.7–8.2)
NEUTS SEG NFR BLD: 33.3 % (ref 43–78)
NRBC # BLD: 0 K/UL (ref 0–0.2)
PLATELET # BLD AUTO: 515 K/UL (ref 150–450)
PMV BLD AUTO: 9.1 FL (ref 9.4–12.3)
POTASSIUM SERPL-SCNC: 3.5 MMOL/L (ref 3.5–5.1)
PROT SERPL-MCNC: 6.8 G/DL (ref 6.3–8.2)
RBC # BLD AUTO: 3.5 M/UL (ref 4.05–5.2)
SODIUM SERPL-SCNC: 139 MMOL/L (ref 136–145)
TROPONIN T SERPL HS-MCNC: <6 NG/L (ref 0–14)
WBC # BLD AUTO: 11.2 K/UL (ref 4.3–11.1)

## 2025-06-07 PROCEDURE — 85379 FIBRIN DEGRADATION QUANT: CPT

## 2025-06-07 PROCEDURE — 96374 THER/PROPH/DIAG INJ IV PUSH: CPT

## 2025-06-07 PROCEDURE — 93005 ELECTROCARDIOGRAM TRACING: CPT

## 2025-06-07 PROCEDURE — 85025 COMPLETE CBC W/AUTO DIFF WBC: CPT

## 2025-06-07 PROCEDURE — 80053 COMPREHEN METABOLIC PANEL: CPT

## 2025-06-07 PROCEDURE — 93010 ELECTROCARDIOGRAM REPORT: CPT | Performed by: INTERNAL MEDICINE

## 2025-06-07 PROCEDURE — 96375 TX/PRO/DX INJ NEW DRUG ADDON: CPT

## 2025-06-07 PROCEDURE — 83690 ASSAY OF LIPASE: CPT

## 2025-06-07 PROCEDURE — 99284 EMERGENCY DEPT VISIT MOD MDM: CPT

## 2025-06-07 PROCEDURE — 84484 ASSAY OF TROPONIN QUANT: CPT

## 2025-06-07 PROCEDURE — 6370000000 HC RX 637 (ALT 250 FOR IP)

## 2025-06-07 PROCEDURE — 6360000002 HC RX W HCPCS

## 2025-06-07 RX ORDER — MAGNESIUM HYDROXIDE/ALUMINUM HYDROXICE/SIMETHICONE 120; 1200; 1200 MG/30ML; MG/30ML; MG/30ML
30 SUSPENSION ORAL
Status: COMPLETED | OUTPATIENT
Start: 2025-06-07 | End: 2025-06-07

## 2025-06-07 RX ORDER — KETOROLAC TROMETHAMINE 15 MG/ML
15 INJECTION, SOLUTION INTRAMUSCULAR; INTRAVENOUS ONCE
Status: COMPLETED | OUTPATIENT
Start: 2025-06-07 | End: 2025-06-07

## 2025-06-07 RX ORDER — UPADACITINIB 15 MG/1
TABLET, EXTENDED RELEASE ORAL
COMMUNITY

## 2025-06-07 RX ORDER — PREDNISONE 20 MG/1
40 TABLET ORAL DAILY
COMMUNITY
Start: 2025-05-30

## 2025-06-07 RX ORDER — HYDROXYZINE PAMOATE 25 MG/1
25 CAPSULE ORAL 3 TIMES DAILY PRN
Qty: 30 CAPSULE | Refills: 0 | Status: SHIPPED | OUTPATIENT
Start: 2025-06-07 | End: 2025-06-17

## 2025-06-07 RX ORDER — FAMOTIDINE 20 MG/1
20 TABLET, FILM COATED ORAL
Status: COMPLETED | OUTPATIENT
Start: 2025-06-07 | End: 2025-06-07

## 2025-06-07 RX ORDER — LORAZEPAM 2 MG/ML
1 INJECTION INTRAMUSCULAR ONCE
Status: COMPLETED | OUTPATIENT
Start: 2025-06-07 | End: 2025-06-07

## 2025-06-07 RX ORDER — PANTOPRAZOLE SODIUM 40 MG/1
40 TABLET, DELAYED RELEASE ORAL DAILY
Qty: 30 TABLET | Refills: 0 | Status: SHIPPED | OUTPATIENT
Start: 2025-06-07 | End: 2025-07-07

## 2025-06-07 RX ORDER — METRONIDAZOLE 500 MG/1
500 TABLET ORAL 3 TIMES DAILY
COMMUNITY
Start: 2025-05-30 | End: 2025-06-09

## 2025-06-07 RX ORDER — LIDOCAINE HYDROCHLORIDE 20 MG/ML
15 SOLUTION OROPHARYNGEAL
Status: COMPLETED | OUTPATIENT
Start: 2025-06-07 | End: 2025-06-07

## 2025-06-07 RX ORDER — CIPROFLOXACIN 500 MG/1
TABLET, FILM COATED ORAL
COMMUNITY
Start: 2025-05-30

## 2025-06-07 RX ORDER — LIDOCAINE HYDROCHLORIDE 20 MG/ML
15 SOLUTION OROPHARYNGEAL PRN
Qty: 100 ML | Refills: 0 | Status: SHIPPED | OUTPATIENT
Start: 2025-06-07

## 2025-06-07 RX ADMIN — ALUMINUM HYDROXIDE, MAGNESIUM HYDROXIDE, DIMETHICONE 30 ML: 400; 400; 40 SUSPENSION ORAL at 11:21

## 2025-06-07 RX ADMIN — LORAZEPAM 1 MG: 2 INJECTION INTRAMUSCULAR; INTRAVENOUS at 11:25

## 2025-06-07 RX ADMIN — LIDOCAINE HYDROCHLORIDE 15 ML: 20 SOLUTION ORAL at 11:21

## 2025-06-07 RX ADMIN — FAMOTIDINE 20 MG: 20 TABLET, FILM COATED ORAL at 12:18

## 2025-06-07 RX ADMIN — KETOROLAC TROMETHAMINE 15 MG: 15 INJECTION, SOLUTION INTRAMUSCULAR; INTRAVENOUS at 12:18

## 2025-06-07 ASSESSMENT — LIFESTYLE VARIABLES
HOW OFTEN DO YOU HAVE A DRINK CONTAINING ALCOHOL: NEVER
HOW MANY STANDARD DRINKS CONTAINING ALCOHOL DO YOU HAVE ON A TYPICAL DAY: PATIENT DOES NOT DRINK

## 2025-06-07 ASSESSMENT — PAIN DESCRIPTION - LOCATION
LOCATION: CHEST

## 2025-06-07 ASSESSMENT — PAIN SCALES - GENERAL
PAINLEVEL_OUTOF10: 8
PAINLEVEL_OUTOF10: 7
PAINLEVEL_OUTOF10: 8
PAINLEVEL_OUTOF10: 3

## 2025-06-07 ASSESSMENT — PAIN DESCRIPTION - ORIENTATION
ORIENTATION: LEFT
ORIENTATION: LEFT

## 2025-06-07 ASSESSMENT — PAIN - FUNCTIONAL ASSESSMENT
PAIN_FUNCTIONAL_ASSESSMENT: 0-10
PAIN_FUNCTIONAL_ASSESSMENT: 0-10

## 2025-06-07 NOTE — ED TRIAGE NOTES
Pt c/o chest pain that worsens when emotional state worsens, also states she was put on abx last week for chrons.

## 2025-06-07 NOTE — ED NOTES
Patient mobility status  with no difficulty.     I have reviewed discharge instructions with the patient.  The patient verbalized understanding.    Patient left ED via Discharge Method: ambulatory to Home with Friend.    Opportunity for questions and clarification provided.     Patient given 3 e scripts.

## 2025-06-07 NOTE — ED PROVIDER NOTES
Emergency Department Provider Note       [facility] EMERGENCY DEPT   PCP: No primary care provider on file.   Age: 24 y.o.   Sex: female     DISPOSITION Decision To Discharge 06/07/2025 01:45:46 PM    ICD-10-CM    1. Chest pain, unspecified type  R07.9       2. Abdominal pain, epigastric  R10.13       3. Anemia, unspecified type  D64.9           Medical Decision Making     Patient is a 24-year-old female with past medical history of Crohn's disease, chronic anemia, and PCOS presents complaining of chest pain.  Patient states over the past 3 days she started with some discomfort in her epigastric region that radiated up into her chest.  Felt like a tightness.  She tried some Tums without relief of her symptoms.  States it has progressively worsened and when she was at work she began to feel worse chest tightness and shortness of breath.    On presentation, patient is afebrile, vital signs are stable, and she is well-appearing in no acute distress.  Her EKG reveals normal sinus rhythm at 61 bpm, no evidence of STEMI.  She does have some tenderness in her epigastric region but no rebound, guarding, or distention.  Breath sounds clear to auscultation in all fields without crackles, wheezes, or other adventitious sounds.    CBC revealed a minimal leukocytosis of 11.2, likely due to patient's steroid use.  She does have a anemia, appears to be microcytic with a hemoglobin of 7.8, has been similar in the past.  She recently finished her menstrual cycle and is supposed to be on iron supplementation which she has stopped taking.  She currently denies any heavy vaginal bleeding, melena, hematochezia, or hematemesis.    CMP with stable electrolytes, kidney, and hepatic function.  Lipase is normal.  Troponin and D-dimer negative.  Chest pain has been ongoing for past 2 to 3 days so do not believe we need a repeat troponin.    Patient does feel improved after GI cocktail, Pepcid, and Ativan.    As patient is hemodynamically stable

## 2025-06-07 NOTE — DISCHARGE INSTRUCTIONS
Please begin taking the protonix daily. You can mix the viscous lidocaine with the Maalox for worsening pain.  You can take the Vistaril as needed for anxiety.    Begin taking your daily iron supplement as well.    If you do notice blood in your stool, return to the ED for evaluation.  You do not need a transfusion at this time but I would like your blood levels to be checked in the next week to 2 weeks.    Continue to monitor symptoms at home.  If you begin to experience any worsening pain, shortness of breath, blood in your stool, abdominal pain, fever, or any new or worsening symptoms return to the ED immediately.

## 2025-06-18 ENCOUNTER — APPOINTMENT (OUTPATIENT)
Dept: GENERAL RADIOLOGY | Age: 25
End: 2025-06-18

## 2025-06-18 ENCOUNTER — HOSPITAL ENCOUNTER (EMERGENCY)
Age: 25
Discharge: HOME | End: 2025-06-18
Attending: EMERGENCY MEDICINE

## 2025-06-18 VITALS
WEIGHT: 180 LBS | HEART RATE: 98 BPM | BODY MASS INDEX: 29.99 KG/M2 | RESPIRATION RATE: 16 BRPM | DIASTOLIC BLOOD PRESSURE: 55 MMHG | TEMPERATURE: 98.9 F | OXYGEN SATURATION: 98 % | SYSTOLIC BLOOD PRESSURE: 102 MMHG | HEIGHT: 65 IN

## 2025-06-18 DIAGNOSIS — J20.9 ACUTE BRONCHITIS, UNSPECIFIED ORGANISM: Primary | ICD-10-CM

## 2025-06-18 DIAGNOSIS — Z87.19 HISTORY OF CROHN'S DISEASE: ICD-10-CM

## 2025-06-18 DIAGNOSIS — J04.0 LARYNGITIS: ICD-10-CM

## 2025-06-18 DIAGNOSIS — D50.9 IRON DEFICIENCY ANEMIA, UNSPECIFIED IRON DEFICIENCY ANEMIA TYPE: ICD-10-CM

## 2025-06-18 LAB
ANION GAP SERPL CALC-SCNC: 14 MMOL/L (ref 7–16)
BUN SERPL-MCNC: 7 MG/DL (ref 6–23)
CALCIUM SERPL-MCNC: 9 MG/DL (ref 8.8–10.2)
CHLORIDE SERPL-SCNC: 103 MMOL/L (ref 98–107)
CO2 SERPL-SCNC: 21 MMOL/L (ref 20–29)
CREAT SERPL-MCNC: 0.6 MG/DL (ref 0.6–1.1)
ERYTHROCYTE [DISTWIDTH] IN BLOOD BY AUTOMATED COUNT: 17.3 % (ref 11.9–14.6)
FERRITIN SERPL-MCNC: 16 NG/ML (ref 8–388)
FLUAV RNA SPEC QL NAA+PROBE: NOT DETECTED
FLUBV RNA SPEC QL NAA+PROBE: NOT DETECTED
GLUCOSE SERPL-MCNC: 91 MG/DL (ref 70–99)
HCT VFR BLD AUTO: 28.2 % (ref 35.8–46.3)
HGB BLD-MCNC: 8.3 G/DL (ref 11.7–15.4)
IRON SERPL-MCNC: 15 UG/DL (ref 35–100)
MCH RBC QN AUTO: 22.2 PG (ref 26.1–32.9)
MCHC RBC AUTO-ENTMCNC: 29.4 G/DL (ref 31.4–35)
MCV RBC AUTO: 75.4 FL (ref 82–102)
NRBC # BLD: 0 K/UL (ref 0–0.2)
PLATELET # BLD AUTO: 405 K/UL (ref 150–450)
PMV BLD AUTO: 8.9 FL (ref 9.4–12.3)
POTASSIUM SERPL-SCNC: 3.4 MMOL/L (ref 3.5–5.1)
RBC # BLD AUTO: 3.74 M/UL (ref 4.05–5.2)
SARS-COV-2 RDRP RESP QL NAA+PROBE: NOT DETECTED
SODIUM SERPL-SCNC: 138 MMOL/L (ref 136–145)
SOURCE: NORMAL
WBC # BLD AUTO: 10.3 K/UL (ref 4.3–11.1)

## 2025-06-18 PROCEDURE — 82728 ASSAY OF FERRITIN: CPT

## 2025-06-18 PROCEDURE — 85027 COMPLETE CBC AUTOMATED: CPT

## 2025-06-18 PROCEDURE — 71046 X-RAY EXAM CHEST 2 VIEWS: CPT

## 2025-06-18 PROCEDURE — 83540 ASSAY OF IRON: CPT

## 2025-06-18 PROCEDURE — 99284 EMERGENCY DEPT VISIT MOD MDM: CPT

## 2025-06-18 PROCEDURE — 87636 SARSCOV2 & INF A&B AMP PRB: CPT

## 2025-06-18 PROCEDURE — 80048 BASIC METABOLIC PNL TOTAL CA: CPT

## 2025-06-18 RX ORDER — ALBUTEROL SULFATE 90 UG/1
2 INHALANT RESPIRATORY (INHALATION) EVERY 4 HOURS PRN
Qty: 1 EACH | Refills: 0 | Status: SHIPPED | OUTPATIENT
Start: 2025-06-18

## 2025-06-18 RX ORDER — FERROUS SULFATE 325(65) MG
325 TABLET ORAL
Qty: 90 TABLET | Refills: 1 | Status: SHIPPED | OUTPATIENT
Start: 2025-06-18

## 2025-06-18 ASSESSMENT — PAIN SCALES - GENERAL: PAINLEVEL_OUTOF10: 5

## 2025-06-18 ASSESSMENT — PAIN - FUNCTIONAL ASSESSMENT: PAIN_FUNCTIONAL_ASSESSMENT: 0-10

## 2025-06-18 NOTE — ED NOTES
Patient mobility status ambulates with no difficulty.     I have reviewed discharge instructions with the patient.  The patient verbalized understanding.    Patient left ED via Discharge Method: ambulatory to Home with Significant Other.    Opportunity for questions and clarification provided.     Patient given 2 scripts.

## 2025-06-18 NOTE — ED TRIAGE NOTES
PT ambulatory to triage with c/o flu-like/cold symptoms x3-5 days.     PT reports sore throat, productive cough- yellow/green in color, fever and back pain.     Denies any N/V/D     PT reports taking OTC dayquil and mucinex with minimal relief

## 2025-06-18 NOTE — DISCHARGE INSTRUCTIONS
NyQuil at night for improved rest and cough suppression DayQuil during the day if needed warm fluids chicken soup and over-the-counter cough drops or cough as well albuterol 2 puffs with spacer chamber every 4 hours as needed for cough during the day but do not use it at night as it can make you jittery and difficult to sleep.  Tylenol and ibuprofen for aches pains and fevers.  Continue current medications.  Return if severe trouble breathing or any new or concerning symptoms and follow-up with the doctor provided in 7 to 10 days if not improved, however cough can persist for several weeks.  Iron as prescribed or at a minimum iron rich diet as outlined in discharge instructions to help with iron deficiency anemia.  If constipation is an issue use MiraLAX daily or every other day while on iron. call the number below to establish primary care

## 2025-06-18 NOTE — ED PROVIDER NOTES
Emergency Department Provider Note     Patient Name: Silvia Cervantes,[id number]  Patient : 2000      ED Room: FT09/09    DISPOSITION Decision To Discharge 2025 10:30:58 AM    ICD-10-CM    1. Acute bronchitis, unspecified organism  J20.9       2. Laryngitis  J04.0       3. Iron deficiency anemia, unspecified iron deficiency anemia type  D50.9       4. History of Crohn's disease  Z87.19              HISTORY OF PRESENT ILLNESS  21-year-old female with Crohn's disease on upadacitinib (Rinvoq) immunosuppression presents via self-transport with 2 days of cough associated with pain, mild cervical lymphadenopathy, congestion, lost voice, and difficulty breathing through the nose. She denies sore throat, fever, or chest pain. She has no prior history of pneumonia, has had COVID-19 previously, and has never received an influenza vaccination or COVID vaccination. She reports vaping but denies smoking, drug, or alcohol use. Allergic to ciprofloxacin.    PAST MEDICAL HISTORY  - Crohn's disease, immunosuppressed, for which the patient takes upadacitinib (Rinvoq).    SOCIAL HISTORY  - Vapes; denies smoking, drug use, and alcohol use    PHYSICAL EXAM  Vitals: Interpreted as normal for this patient.  General: Appears normal, not overly pale.  Eyes: Conjunctiva normal, not pale.  HENT: Moist mucous membranes, oropharynx clear.  Neck: Mild cervical lymphadenopathy, supple, full range of motion, trachea midline, no visible neck swelling or mass, no midline tenderness, no stepoff or deformity, no meningeal signs, no JVD, no carotid bruit, normal carotid pulses bilaterally.  Cardiac: Regular rate and rhythm, radial pulses 2+ bilaterally, capillary refill &lt;2 seconds, no significant murmurs appreciated.  Respiratory: Rales in right lower lobe, clearing with coughing, no respiratory distress, normal respiratory rate, good air exchange, able to speak in full fluent sentences.  Abdomen: Soft, nontender, nondistended, no

## 2025-08-23 ENCOUNTER — APPOINTMENT (OUTPATIENT)
Dept: GENERAL RADIOLOGY | Age: 25
End: 2025-08-23

## 2025-08-23 ENCOUNTER — HOSPITAL ENCOUNTER (EMERGENCY)
Age: 25
Discharge: HOME | End: 2025-08-23

## 2025-08-23 VITALS
BODY MASS INDEX: 29.99 KG/M2 | SYSTOLIC BLOOD PRESSURE: 129 MMHG | HEIGHT: 65 IN | DIASTOLIC BLOOD PRESSURE: 85 MMHG | OXYGEN SATURATION: 99 % | TEMPERATURE: 98 F | RESPIRATION RATE: 16 BRPM | WEIGHT: 180 LBS | HEART RATE: 106 BPM

## 2025-08-23 DIAGNOSIS — S29.011A INTERCOSTAL MUSCLE STRAIN, INITIAL ENCOUNTER: Primary | ICD-10-CM

## 2025-08-23 PROCEDURE — 71045 X-RAY EXAM CHEST 1 VIEW: CPT

## 2025-08-23 PROCEDURE — 99284 EMERGENCY DEPT VISIT MOD MDM: CPT

## 2025-08-23 PROCEDURE — 96372 THER/PROPH/DIAG INJ SC/IM: CPT

## 2025-08-23 PROCEDURE — 6360000002 HC RX W HCPCS: Performed by: PHYSICIAN ASSISTANT

## 2025-08-23 PROCEDURE — 6370000000 HC RX 637 (ALT 250 FOR IP): Performed by: PHYSICIAN ASSISTANT

## 2025-08-23 RX ORDER — METHOCARBAMOL 750 MG/1
750 TABLET, FILM COATED ORAL 3 TIMES DAILY PRN
Qty: 21 TABLET | Refills: 0 | Status: SHIPPED | OUTPATIENT
Start: 2025-08-23 | End: 2025-08-23

## 2025-08-23 RX ORDER — KETOROLAC TROMETHAMINE 30 MG/ML
30 INJECTION, SOLUTION INTRAMUSCULAR; INTRAVENOUS
Status: COMPLETED | OUTPATIENT
Start: 2025-08-23 | End: 2025-08-23

## 2025-08-23 RX ORDER — NAPROXEN 500 MG/1
500 TABLET ORAL 2 TIMES DAILY PRN
Qty: 14 TABLET | Refills: 0 | Status: SHIPPED | OUTPATIENT
Start: 2025-08-23 | End: 2025-08-30

## 2025-08-23 RX ORDER — METHOCARBAMOL 750 MG/1
750 TABLET, FILM COATED ORAL 3 TIMES DAILY PRN
Qty: 21 TABLET | Refills: 0 | Status: SHIPPED | OUTPATIENT
Start: 2025-08-23 | End: 2025-08-30

## 2025-08-23 RX ORDER — METHOCARBAMOL 750 MG/1
750 TABLET, FILM COATED ORAL
Status: COMPLETED | OUTPATIENT
Start: 2025-08-23 | End: 2025-08-23

## 2025-08-23 RX ORDER — NAPROXEN 500 MG/1
500 TABLET ORAL 2 TIMES DAILY PRN
Qty: 14 TABLET | Refills: 0 | Status: SHIPPED | OUTPATIENT
Start: 2025-08-23 | End: 2025-08-23

## 2025-08-23 RX ORDER — HYDROXYZINE HYDROCHLORIDE 25 MG/1
25 TABLET, FILM COATED ORAL 3 TIMES DAILY PRN
COMMUNITY

## 2025-08-23 RX ADMIN — METHOCARBAMOL 750 MG: 750 TABLET ORAL at 17:03

## 2025-08-23 RX ADMIN — KETOROLAC TROMETHAMINE 30 MG: 30 INJECTION, SOLUTION INTRAMUSCULAR at 17:04

## 2025-08-23 ASSESSMENT — PAIN DESCRIPTION - LOCATION: LOCATION: RIB CAGE

## 2025-08-23 ASSESSMENT — PAIN SCALES - GENERAL: PAINLEVEL_OUTOF10: 10

## 2025-08-23 ASSESSMENT — PAIN DESCRIPTION - PAIN TYPE: TYPE: ACUTE PAIN

## 2025-08-23 ASSESSMENT — PAIN - FUNCTIONAL ASSESSMENT: PAIN_FUNCTIONAL_ASSESSMENT: 0-10

## 2025-08-23 ASSESSMENT — PAIN DESCRIPTION - ORIENTATION: ORIENTATION: RIGHT

## (undated) DEVICE — CANNULA NSL ORAL AD FOR CAPNOFLEX CO2 O2 AIRLFE

## (undated) DEVICE — GAUZE,SPONGE,4"X4",12PLY,WOVEN,NS,LF: Brand: MEDLINE

## (undated) DEVICE — CONTAINER FORMALIN PREFILLED 10% NBF 60ML

## (undated) DEVICE — LUBE JELLY FOIL PACK 1.4 OZ: Brand: MEDLINE INDUSTRIES, INC.

## (undated) DEVICE — FORCEPS BX L240CM JAW DIA2.8MM L CAP W/ NDL MIC MESH TOOTH

## (undated) DEVICE — MOUTHPIECE ENDOSCP L CTRL OPN AND SIDE PORTS DISP

## (undated) DEVICE — KENDALL RADIOLUCENT FOAM MONITORING ELECTRODE RECTANGULAR SHAPE: Brand: KENDALL

## (undated) DEVICE — SYRINGE MED 10ML LUERLOCK TIP W/O SFTY DISP

## (undated) DEVICE — BLOCK BITE AD 60FR W/ VELC STRP ADDRESSES MOST PT AND

## (undated) DEVICE — AIRLIFE™ OXYGEN TUBING 7 FEET (2.1 M) CRUSH RESISTANT OXYGEN TUBING, VINYL TIPPED: Brand: AIRLIFE™

## (undated) DEVICE — SINGLE PORT MANIFOLD: Brand: NEPTUNE 2

## (undated) DEVICE — NEEDLE SYRINGE 18GA L1.5IN RED PLAS HUB S STL BLNT FILL W/O

## (undated) DEVICE — ENDOSCOPIC KIT 1.1+ OP4 CA DE 2 GWN AAMI LEVEL 3

## (undated) DEVICE — SYRINGE MEDICAL 3ML CLEAR PLASTIC STANDARD NON CONTROL LUERLOCK TIP DISPOSABLE

## (undated) DEVICE — CONNECTOR TBNG OD5-7MM O2 END DISP